# Patient Record
Sex: FEMALE | Race: WHITE | NOT HISPANIC OR LATINO | Employment: FULL TIME | URBAN - METROPOLITAN AREA
[De-identification: names, ages, dates, MRNs, and addresses within clinical notes are randomized per-mention and may not be internally consistent; named-entity substitution may affect disease eponyms.]

---

## 2021-09-29 ENCOUNTER — APPOINTMENT (OUTPATIENT)
Dept: RADIOLOGY | Facility: CLINIC | Age: 26
End: 2021-09-29
Payer: COMMERCIAL

## 2021-09-29 ENCOUNTER — OFFICE VISIT (OUTPATIENT)
Dept: URGENT CARE | Facility: CLINIC | Age: 26
End: 2021-09-29
Payer: COMMERCIAL

## 2021-09-29 VITALS
TEMPERATURE: 99.2 F | BODY MASS INDEX: 28.52 KG/M2 | RESPIRATION RATE: 16 BRPM | OXYGEN SATURATION: 99 % | WEIGHT: 155 LBS | DIASTOLIC BLOOD PRESSURE: 90 MMHG | SYSTOLIC BLOOD PRESSURE: 140 MMHG | HEIGHT: 62 IN | HEART RATE: 81 BPM

## 2021-09-29 DIAGNOSIS — M79.671 RIGHT FOOT PAIN: Primary | ICD-10-CM

## 2021-09-29 DIAGNOSIS — M79.671 RIGHT FOOT PAIN: ICD-10-CM

## 2021-09-29 PROCEDURE — 73630 X-RAY EXAM OF FOOT: CPT

## 2021-09-29 PROCEDURE — 99213 OFFICE O/P EST LOW 20 MIN: CPT | Performed by: PHYSICIAN ASSISTANT

## 2021-09-29 NOTE — LETTER
September 29, 2021     Patient: Marcelo Joseph   YOB: 1995   Date of Visit: 9/29/2021       To Whom It May Concern: It is my medical opinion that Marcelo Joseph may return to work on ***  If you have any questions or concerns, please don't hesitate to call           Sincerely,        May Toure PA-C    CC:   No Recipients

## 2021-09-29 NOTE — PATIENT INSTRUCTIONS
My interpretation of XR is no acute fracture or dislocation, sesamoid bone present on XR, official read is pending   Suspect pain most likely related to over use strain/sprain  Recommend continued use of rest, ice, compression and elevation  Continue ibuprofen/tylenol as needed for discomfort  Follow up with PCP  Return or be seen in ER with any worsening or progressing symptoms  Patient understands and is agreeable with this plan

## 2021-09-29 NOTE — LETTER
September 29, 2021     Patient: Thomas Alanis   YOB: 1995   Date of Visit: 9/29/2021       To Whom It May Concern: It is my medical opinion that Thomas Alanis may return to work on 9/30/21  If you have any questions or concerns, please don't hesitate to call           Sincerely,        Zachery Ramirez PA-C    CC: No Recipients

## 2021-09-29 NOTE — PROGRESS NOTES
[facility]        NAME: Radha Conley is a 31 y o  female  : 1995    MRN: [id number]  DATE: 2021  TIME: 10:19 AM    Assessment and Plan   Right foot pain [M79 671]  1  Right foot pain  XR foot 3+ vw right         Patient Instructions   Patient Instructions   My interpretation of XR is no acute fracture or dislocation, sesamoid bone present on XR, official read is pending   Suspect pain most likely related to over use strain/sprain  Recommend continued use of rest, ice, compression and elevation  Continue ibuprofen/tylenol as needed for discomfort  Follow up with PCP  Return or be seen in ER with any worsening or progressing symptoms  Patient understands and is agreeable with this plan  Follow up with PCP in 3-5 days  Proceed to  ER if symptoms worsen  Chief Complaint     Chief Complaint   Patient presents with    Foot Pain     Right foot pain started approx 1 week ago  Pt denies any acute injuries  History of Present Illness       Patient is a 24-year-old female presenting today for right foot pain x1 week  Patient notes following a long walk she felt some pain and discomfort the top of her right foot, states that she has been applying ice and taking ibuprofen twice a day with mild resolution of her symptoms, states the pain has been constant since onset, describes the pain as dull and achy, worse with ambulation and palpation  Denies numbness, tingling, bruising, swelling, weakness, inability to bear weight  Review of Systems   Review of Systems   Constitutional: Negative for chills and fever  HENT: Negative for ear pain and sore throat  Eyes: Negative for pain and visual disturbance  Respiratory: Negative for cough and shortness of breath  Cardiovascular: Negative for chest pain and palpitations  Gastrointestinal: Negative for abdominal pain and vomiting  Genitourinary: Negative for dysuria and hematuria     Musculoskeletal:        Right foot pain   Skin: Negative for color change and rash  Neurological: Negative for seizures and syncope  All other systems reviewed and are negative  Current Medications     No current outpatient medications on file  Current Allergies     Allergies as of 09/29/2021    (No Known Allergies)            The following portions of the patient's history were reviewed and updated as appropriate: allergies, current medications, past family history, past medical history, past social history, past surgical history and problem list      History reviewed  No pertinent past medical history  History reviewed  No pertinent surgical history  History reviewed  No pertinent family history  Medications have been verified  Objective   /90   Pulse 81   Temp 99 2 °F (37 3 °C)   Resp 16   Ht 5' 2" (1 575 m)   Wt 70 3 kg (155 lb)   SpO2 99%   BMI 28 35 kg/m²        Physical Exam     Physical Exam  Vitals reviewed  Constitutional:       Appearance: Normal appearance  HENT:      Head: Normocephalic and atraumatic  Right Ear: External ear normal       Left Ear: External ear normal       Nose: Nose normal    Eyes:      Conjunctiva/sclera: Conjunctivae normal    Cardiovascular:      Rate and Rhythm: Normal rate  Pulses: Normal pulses  Pulmonary:      Effort: Pulmonary effort is normal    Musculoskeletal:      Comments: No obvious deformity of right foot, no bruising, no swelling, mild TTP lateral dorsal aspect of right foot between 4th and 5th metatarsal   No medial or lateral malleolar tenderness, able to ambulate with mild discomfort, 5/5 strength of lower extremities bilaterally, gross sensation intact, full active and passive range of motion of lower extremities bilaterally, 2+ distal pulses  Skin:     General: Skin is warm  Capillary Refill: Capillary refill takes less than 2 seconds  Neurological:      General: No focal deficit present        Mental Status: She is alert and oriented to person, place, and time

## 2022-01-06 NOTE — PROGRESS NOTES
FAMILY PRACTICE HEALTH MAINTENANCE OFFICE VISIT  [facility]    NAME: Miky Acharya  AGE: 31 y o  SEX: female  : 1995     DATE: 2022    Assessment and Plan     There are no diagnoses linked to this encounter  · Patient Counseling:   · Nutrition: Stressed importance of a well balanced diet, moderation of sodium/saturated fat, caloric balance and sufficient intake of fiber  · Exercise: Stressed the importance of regular exercise with a goal of 150 minutes per week  · Dental Health: Discussed daily flossing and brushing and regular dental visits     · Immunizations reviewed: see orders  · Discussed benefits of:  Cervical Cancer screening and Screening labs   BMI Counseling: There is no height or weight on file to calculate BMI  Discussed with patient's BMI with her  The BMI is above normal  Nutrition recommendations include reducing portion sizes, decreasing overall calorie intake and 3-5 servings of fruits/vegetables daily  Exercise recommendations include moderate aerobic physical activity for 150 minutes/week  No follow-ups on file  Chief Complaint   No chief complaint on file  History of Present Illness     NP  From here and recently moved back from [place] to help her mom who has caner  She is doing well  She denies complaints or issues today  Feels well         Well Adult Physical   Patient here for a comprehensive physical exam       Diet and Physical Activity  Diet: well balanced diet  Exercise: daily      Depression Screen  PHQ-2/9 Depression Screening            General Health  Hearing: Normal:  bilateral  Vision: no vision problems  Dental: regular dental visits    Reproductive Health  No issues  and Follows with gynecologist      The following portions of the patient's history were reviewed and updated as appropriate: allergies, current medications, past family history, past medical history, past social history, past surgical history and problem list     Review of Systems     Review of Systems   Constitutional: Negative  HENT: Negative  Eyes: Negative  Respiratory: Negative  Cardiovascular: Negative  Gastrointestinal: Negative  Endocrine: Negative  Genitourinary: Negative  Musculoskeletal: Negative  Skin: Negative  Allergic/Immunologic: Negative  Neurological: Negative  Hematological: Negative  Psychiatric/Behavioral: Negative  Past Medical History     No past medical history on file  Past Surgical History     No past surgical history on file  Social History     Social History     Socioeconomic History    Marital status: Not on file     Spouse name: Not on file    Number of children: Not on file    Years of education: Not on file    Highest education level: Not on file   Occupational History    Not on file   Tobacco Use    Smoking status: Not on file    Smokeless tobacco: Not on file   Substance and Sexual Activity    Alcohol use: Not on file    Drug use: Not on file    Sexual activity: Not on file   Other Topics Concern    Not on file   Social History Narrative    Not on file     Social Determinants of Health     Financial Resource Strain: Not on file   Food Insecurity: Not on file   Transportation Needs: Not on file   Physical Activity: Not on file   Stress: Not on file   Social Connections: Not on file   Intimate Partner Violence: Not on file   Housing Stability: Not on file       Family History     No family history on file  Current Medications     No current outpatient medications on file  Allergies     Not on File    Objective     There were no vitals taken for this visit  Physical Exam  Constitutional:       General: She is not in acute distress  Appearance: She is well-developed  She is not diaphoretic  HENT:      Head: Normocephalic and atraumatic  Right Ear: External ear normal       Left Ear: External ear normal    Neck:      Thyroid: No thyromegaly  Cardiovascular:      Rate and Rhythm: Normal rate and regular rhythm  Heart sounds: Normal heart sounds  No murmur heard  No friction rub  No gallop  Pulmonary:      Effort: Pulmonary effort is normal       Breath sounds: Normal breath sounds  No wheezing or rales  Abdominal:      General: Bowel sounds are normal       Palpations: Abdomen is soft  There is no mass  Tenderness: There is no abdominal tenderness  There is no rebound  Musculoskeletal:         General: No deformity  Normal range of motion  Cervical back: Normal range of motion and neck supple  Lymphadenopathy:      Cervical: No cervical adenopathy  Skin:     General: Skin is warm and dry  Findings: No rash  Neurological:      Mental Status: She is alert and oriented to person, place, and time  Cranial Nerves: No cranial nerve deficit  Motor: No abnormal muscle tone  Coordination: Coordination normal       Deep Tendon Reflexes: Reflexes are normal and symmetric  Reflexes normal    Psychiatric:         Behavior: Behavior normal          Thought Content:  Thought content normal          Judgment: Judgment normal            No exam data present        Townsend Schlatter, [address]

## 2022-01-10 ENCOUNTER — OFFICE VISIT (OUTPATIENT)
Dept: FAMILY MEDICINE CLINIC | Facility: CLINIC | Age: 27
End: 2022-01-10
Payer: COMMERCIAL

## 2022-01-10 VITALS
BODY MASS INDEX: 29.77 KG/M2 | HEIGHT: 63 IN | RESPIRATION RATE: 16 BRPM | SYSTOLIC BLOOD PRESSURE: 102 MMHG | DIASTOLIC BLOOD PRESSURE: 62 MMHG | HEART RATE: 80 BPM | WEIGHT: 168 LBS | TEMPERATURE: 98.1 F | OXYGEN SATURATION: 99 %

## 2022-01-10 DIAGNOSIS — Z00.00 WELL ADULT EXAM: Primary | ICD-10-CM

## 2022-01-10 DIAGNOSIS — Z13.6 SCREENING FOR CARDIOVASCULAR CONDITION: ICD-10-CM

## 2022-01-10 DIAGNOSIS — R53.83 OTHER FATIGUE: ICD-10-CM

## 2022-01-10 DIAGNOSIS — Z23 NEED FOR VACCINATION: ICD-10-CM

## 2022-01-10 DIAGNOSIS — F43.9 STRESS: ICD-10-CM

## 2022-01-10 PROBLEM — L70.8 OTHER ACNE: Status: ACTIVE | Noted: 2022-01-10

## 2022-01-10 PROCEDURE — 3008F BODY MASS INDEX DOCD: CPT | Performed by: INTERNAL MEDICINE

## 2022-01-10 PROCEDURE — 1036F TOBACCO NON-USER: CPT | Performed by: INTERNAL MEDICINE

## 2022-01-10 PROCEDURE — 99385 PREV VISIT NEW AGE 18-39: CPT | Performed by: INTERNAL MEDICINE

## 2022-01-10 PROCEDURE — 3725F SCREEN DEPRESSION PERFORMED: CPT | Performed by: INTERNAL MEDICINE

## 2022-01-10 PROCEDURE — 90471 IMMUNIZATION ADMIN: CPT

## 2022-01-10 PROCEDURE — 90715 TDAP VACCINE 7 YRS/> IM: CPT

## 2022-01-10 RX ORDER — SPIRONOLACTONE 100 MG/1
100 TABLET, FILM COATED ORAL DAILY
COMMUNITY
End: 2022-06-21

## 2022-01-12 ENCOUNTER — APPOINTMENT (OUTPATIENT)
Dept: LAB | Facility: CLINIC | Age: 27
End: 2022-01-12
Payer: COMMERCIAL

## 2022-01-12 DIAGNOSIS — R53.83 OTHER FATIGUE: ICD-10-CM

## 2022-01-12 DIAGNOSIS — Z13.6 SCREENING FOR CARDIOVASCULAR CONDITION: ICD-10-CM

## 2022-01-12 LAB
ALBUMIN SERPL BCP-MCNC: 4.1 G/DL (ref 3.5–5)
ALP SERPL-CCNC: 40 U/L (ref 46–116)
ALT SERPL W P-5'-P-CCNC: 16 U/L (ref 12–78)
ANION GAP SERPL CALCULATED.3IONS-SCNC: 3 MMOL/L (ref 4–13)
AST SERPL W P-5'-P-CCNC: 9 U/L (ref 5–45)
BASOPHILS # BLD AUTO: 0.01 THOUSANDS/ΜL (ref 0–0.1)
BASOPHILS NFR BLD AUTO: 0 % (ref 0–1)
BILIRUB SERPL-MCNC: 0.63 MG/DL (ref 0.2–1)
BUN SERPL-MCNC: 9 MG/DL (ref 5–25)
CALCIUM SERPL-MCNC: 9.2 MG/DL (ref 8.3–10.1)
CHLORIDE SERPL-SCNC: 108 MMOL/L (ref 100–108)
CHOLEST SERPL-MCNC: 196 MG/DL
CO2 SERPL-SCNC: 27 MMOL/L (ref 21–32)
CREAT SERPL-MCNC: 0.79 MG/DL (ref 0.6–1.3)
EOSINOPHIL # BLD AUTO: 0.14 THOUSAND/ΜL (ref 0–0.61)
EOSINOPHIL NFR BLD AUTO: 2 % (ref 0–6)
ERYTHROCYTE [DISTWIDTH] IN BLOOD BY AUTOMATED COUNT: 11.8 % (ref 11.6–15.1)
GFR SERPL CREATININE-BSD FRML MDRD: 102 ML/MIN/1.73SQ M
GLUCOSE P FAST SERPL-MCNC: 82 MG/DL (ref 65–99)
HCT VFR BLD AUTO: 39.9 % (ref 34.8–46.1)
HDLC SERPL-MCNC: 54 MG/DL
HGB BLD-MCNC: 13.8 G/DL (ref 11.5–15.4)
IMM GRANULOCYTES # BLD AUTO: 0.03 THOUSAND/UL (ref 0–0.2)
IMM GRANULOCYTES NFR BLD AUTO: 0 % (ref 0–2)
LDLC SERPL CALC-MCNC: 124 MG/DL (ref 0–100)
LYMPHOCYTES # BLD AUTO: 1.43 THOUSANDS/ΜL (ref 0.6–4.47)
LYMPHOCYTES NFR BLD AUTO: 19 % (ref 14–44)
MCH RBC QN AUTO: 32 PG (ref 26.8–34.3)
MCHC RBC AUTO-ENTMCNC: 34.6 G/DL (ref 31.4–37.4)
MCV RBC AUTO: 93 FL (ref 82–98)
MONOCYTES # BLD AUTO: 0.51 THOUSAND/ΜL (ref 0.17–1.22)
MONOCYTES NFR BLD AUTO: 7 % (ref 4–12)
NEUTROPHILS # BLD AUTO: 5.37 THOUSANDS/ΜL (ref 1.85–7.62)
NEUTS SEG NFR BLD AUTO: 72 % (ref 43–75)
NONHDLC SERPL-MCNC: 142 MG/DL
NRBC BLD AUTO-RTO: 0 /100 WBCS
PLATELET # BLD AUTO: 240 THOUSANDS/UL (ref 149–390)
PMV BLD AUTO: 10.3 FL (ref 8.9–12.7)
POTASSIUM SERPL-SCNC: 4.2 MMOL/L (ref 3.5–5.3)
PROT SERPL-MCNC: 7.3 G/DL (ref 6.4–8.2)
RBC # BLD AUTO: 4.31 MILLION/UL (ref 3.81–5.12)
SODIUM SERPL-SCNC: 138 MMOL/L (ref 136–145)
TRIGL SERPL-MCNC: 92 MG/DL
TSH SERPL DL<=0.05 MIU/L-ACNC: 0.83 UIU/ML (ref 0.36–3.74)
WBC # BLD AUTO: 7.49 THOUSAND/UL (ref 4.31–10.16)

## 2022-01-12 PROCEDURE — 36415 COLL VENOUS BLD VENIPUNCTURE: CPT

## 2022-01-12 PROCEDURE — 80053 COMPREHEN METABOLIC PANEL: CPT

## 2022-01-12 PROCEDURE — 85025 COMPLETE CBC W/AUTO DIFF WBC: CPT

## 2022-01-12 PROCEDURE — 80061 LIPID PANEL: CPT

## 2022-01-12 PROCEDURE — 84443 ASSAY THYROID STIM HORMONE: CPT

## 2022-01-20 ENCOUNTER — TELEPHONE (OUTPATIENT)
Dept: BEHAVIORAL/MENTAL HEALTH CLINIC | Facility: CLINIC | Age: 27
End: 2022-01-20

## 2022-01-20 NOTE — TELEPHONE ENCOUNTER
Behavorial Health Outpatient Intake Questions    Referred by: PC    Please advised interviewee that they need to answer all questions truthfully to allow for best care and any misrepresentations of information may affect their ability to be seen at this clinic   => Was this discussed? Yes     [facility] -     Presenting Problem (in patient's words): Life stresses  Are there any developmental disabilities? ? If yes, can they speak to you on the phone? If they are too limited to speak to you on phone, refer out No    Are you taking any psychiatric medications? No    => If yes, who prescribes? If yes, are they injectable medications? Does the patient have a language barrier or hearing impairment? No    Have you been treated at [facility] by a therapist or a doctor in the past? If yes, who? No    Has the patient been hospitalized for mental health? No   If yes, how long ago was last hospitalization and where was it? Do you actively use alcohol or marijuana or illegal substances? If yes, what and how much - refer out to Drug and alcohol treatment if use is excessive or daily use of illegal substances none    Do you have a community treatment team or ? No    Legal History-     Does the patient have any history of arrests, custodial/nursing home time, or DUIs? No  If Yes-  1) What types of charges? 2) When were they last incarcerated? 3) Are they currently on parole or probation? Minor Child-    Who has custody of the child? Is there a custody agreement? If there is a custody agreement remind parent that they must bring a copy to the first appt or they will not be seen       Intake Team, please check with provider before scheduling if flags come up such as:  - complex case  - legal history (other than DUI)  - communication barrier concerns are present  - if, in your judgment, this needs further review    ACCEPTED as a patient Yes  => Appointment Date: 1/25/2022    Referred Elsewhere? Name of Insurance Co:  Insurance ID#  [employer] #  If ins is primary or secondary  If patient is a minor, parents information such as Name, D  O B of guarantor

## 2022-01-25 ENCOUNTER — SOCIAL WORK (OUTPATIENT)
Dept: BEHAVIORAL/MENTAL HEALTH CLINIC | Facility: CLINIC | Age: 27
End: 2022-01-25
Payer: COMMERCIAL

## 2022-01-25 DIAGNOSIS — F43.22 ADJUSTMENT DISORDER WITH ANXIOUS MOOD: Primary | ICD-10-CM

## 2022-01-25 DIAGNOSIS — F43.9 STRESS: ICD-10-CM

## 2022-01-25 PROCEDURE — 90791 PSYCH DIAGNOSTIC EVALUATION: CPT | Performed by: SOCIAL WORKER

## 2022-01-25 NOTE — PSYCH
This note was not shared with the patient due to this is a psychotherapy note    Assessment/Plan:      Diagnoses and all orders for this visit:    Adjustment disorder with anxious mood    Stress  -     Ambulatory referral to Psychiatry          Subjective: Ira Maciel is seeking mental health support to help cope and repair sentiments of burn out  She describes herself as apathetic, a "worrier," dissatisfied with her job, and avoidant of intimate partnerships  She is recently moved back home to [address] having lived in [facility] [place]  for the past 10 years  Denies SI/HI/substance abuse/overt psychotic features/self harm/eating disorder  Patient ID: Aide Goldberg is a 31 y o  female  HPI:     Pre-morbid level of function and History of Present Illness: Ira Maciel reports feeling a built up of pressure and dissatisfaction within the past year, due superficially to the Covid 19 pandemic  Previous Psychiatric/psychological treatment/year: Some outpatient counseling when she was 16-14 years old but wasn't "ready for it"  Current Psychiatrist/Therapist: None  Outpatient and/or Partial and Other [employer] Used (CTT, ICM, VNA): None  Problem Assessment:     SOCIAL/VOCATION:  Family Constellation (include parents, relationship with each and pertinent Psych/Medical History):     Family History   Problem Relation Age of Onset    Throat cancer Mother     Heart attack Father     Colon cancer Maternal Grandfather     Colon cancer Maternal Jaleel Mchugh is the only child to her biological parents  Her father is  having  of a heart attack when she was 12years old  She has a strong relationship with her mother, who was diagnosed with throat cancer in   Ira Maciel took a temporary leave from her position at work and moved home to take care of her while she underwent chemotherapy and radiation  Her mom is now in remission and doing well   There is a strong history of alcohol use disorder on both sides of her family, and her father struggled with this greatly  She and her mom attended [facility] for support  Cherise Caba relates best to her mom  she lives with her mom  she does not live alone  Domestic Violence: No past history of domestic violence    Additional Comments related to family/relationships/peer support: Cherise Caba has always felt slightly out of place within her peer group due to being an only child  School or Work History (strengths/limitations/needs): Cherise Caba reports having a strong identity linked to her academic performance  She 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 and went to [employer] to pursue a degree in [place] [employer] and [employer]  While there, she reports feel humbled having met "the other big fish in a small pond"  After graduation she remained in [place], [place]  She currently works for the [employer] and is a   She currently feels that she has hit her "ceiling" and is thus, looking to explore other opportunities  Her highest grade level achieved was a Bachelor's degree   history includes none  Financial status includes comfortable; able to meet basic needs  LEISURE ASSESSMENT (Include past and present hobbies/interests and level of involvement (Ex: Group/Club Affiliations): Skiing, art & culture, going to museums, cooking and having dinner parties, watching television and movies, walking, and going to concerts  her primary language is [place]  Preferred language is [place]  Ethnic considerations are "[address] guilt"  Religions affiliations and level of involvement - none  Does spirituality help you cope? No    FUNCTIONAL STATUS: There has been a recent change in Cherise Caba ability to do the following: None  Level of Assistance Needed/By Whom?: N/A      Cherise Caba learns best by  reading    SUBSTANCE ABUSE ASSESSMENT: no substance abuse    Substance/Route/Age/Amount/Frequency/Last Use: None  DETOX HISTORY: None  Previous detox/rehab treatment: N/A    HEALTH ASSESSMENT: no referral to PCP needed    LEGAL: No Mental Health Advance Directive or Power of  on file    Prenatal History: uneventful pregnancy    Delivery History: born by vaginal delivery    Developmental Milestones: Within normal ranges  Temperament as an infant was normal     Temperament as a toddler was normal   Temperament at school age was normal   Temperament as a teenager was normal     Risk Assessment:   The following ratings are based on my interview(s) with the patient  Risk of Harm to Self:   Demographic risk factors include   Historical Risk Factors include None  Recent Specific Risk Factors include None  Additional Factors for a Child or Adolescent N/A    Risk of Harm to Others:   Demographic Risk Factors include None  Historical Risk Factors include None  Recent Specific Risk Factors include None  Access to Weapons:   Urban Dannielle has access to the following weapons: none   The following steps have been taken to ensure weapons are properly secured: N/A    Based on the above information, the client presents the following risk of harm to self or others:  low    The following interventions are recommended:   no intervention changes    Notes regarding this Risk Assessment: None    Review Of Systems:     Mood Normal   Behavior Normal    Thought Content Normal   General Normal    Personality Normal   Other Psych Symptoms Normal   Constitutional Normal   ENT Normal   Cardiovascular Normal    Respiratory Normal    Gastrointestinal Normal   Genitourinary Normal    Musculoskeletal Negative   Integumentary Normal    Neurological Normal    Endocrine Normal          Mental status:  Appearance calm and cooperative , adequate hygiene and grooming and good eye contact    Mood euthymic   Affect affect appropriate    Speech a normal rate   Thought Processes normal thought processes   Hallucinations no hallucinations present    Thought Content no delusions   Abnormal Thoughts no suicidal thoughts  and no homicidal thoughts    Orientation  oriented to person and place and time   Remote Memory short term memory intact and long term memory intact   Attention Span concentration intact   Intellect Appears to be of Average Intelligence   Fund of Knowledge displays adequate knowledge of current events, adequate fund of knowledge regarding past history and adequate fund of knowledge regarding vocabulary    Insight Insight intact   Judgement judgment was intact   Muscle Strength Muscle strength and tone were normal   Language no difficulty naming common objects, no difficulty repeating a phrase  and no difficulty writing a sentence    Pain none   Pain Scale 0

## 2022-01-25 NOTE — BH TREATMENT PLAN
Fauzia Spicer  1995       Date of Initial Treatment Plan: 01/25/2022  Date of Current Treatment Plan: 01/25/22    Treatment Plan Number 1    Strengths/Personal Resources for Self Care: "Good friend," have insight, relative stable mood    Diagnosis:   1  Adjustment disorder with anxious mood     2  Stress  Ambulatory referral to Psychiatry       Area of Needs: Life transitions      Long Term Goal 1: Develop positive coping strategies to help transition into different phases of life (job, relationships)    Target Date: 7/25/2022  Completion Date: N/A         Short Term Objectives for Goal 1: Identify top 3 values which guide decisions, discuss and acknowledge 2 triggers and cues for rising stress, learn about and practice at least 2 grounding/relaxation exercises weekly, practice setting intentional and challenging limits which confront people pleasing/avoidance of conflicts and vulnerability with others, identify at least 3 "parts" of the self and process their origin, function, and unmet emotional needs  GOAL 1: Modality: Individual 4x per month   Completion Date 7/25/2022 and The person(s) responsible for carrying out the plan is  the patient  Behavioral Health Treatment Plan [facility]: Diagnosis and Treatment Plan explained to Delta Weinstein relates understanding diagnosis and is agreeable to Treatment Plan  Client Comments : Please share your thoughts, feelings, need and/or experiences regarding your treatment plan: None

## 2022-02-01 ENCOUNTER — SOCIAL WORK (OUTPATIENT)
Dept: BEHAVIORAL/MENTAL HEALTH CLINIC | Facility: CLINIC | Age: 27
End: 2022-02-01
Payer: COMMERCIAL

## 2022-02-01 DIAGNOSIS — F43.22 ADJUSTMENT DISORDER WITH ANXIOUS MOOD: Primary | ICD-10-CM

## 2022-02-01 PROCEDURE — 90834 PSYTX W PT 45 MINUTES: CPT | Performed by: SOCIAL WORKER

## 2022-02-01 NOTE — PSYCH
This note was not shared with the patient due to this is a psychotherapy note    Psychotherapy Provided: Individual Psychotherapy 60 minutes     Length of time in session: 60 minutes, follow up in 1 week    Encounter Diagnosis     ICD-10-CM    1  Adjustment disorder with anxious mood  F43 [id number]        Goals addressed in session: Goal 1     Pain:      none    0    Current suicide risk : Low     DATA: Met with Nora for scheduled individual session  Reported feeling well overall and denied significant shifts in mood  Discussed family history of needing to take care of her mother multiple times for near death experiences (sepsis in college and then having cancer in 2020) and how this, plus her father's alcoholism, has impacted her ability to meet her emotional needs directly  Writer pointed out a number of times during the appointment where she disregarded her needs before even saying them, which once challenged, she was able to point out  Worked on building self compassion through identifying things that bring her mark, and allowing herself to live only for herself  ASSESSMENT: Loida Brito presents with a normal mood  Her affect is normal range and intensity, appropriate  Loida Brito exhibits good therapeutic rapport with this clinician  Loida Brito continues to exhibit willingness to work on treatment goals and objectives  Loida Brito presents with a minimal risk of suicide, minimal risk of self-harm, and minimal risk of harm to others  Reviewed and signed treatment plan  PLAN: Loida Brito will return in 1 week for the next scheduled session  Between sessions, Loida Brito will be intentional in speaking her emotional needs, and will report back during the next session re: successes and barriers   At the next session, this clinician will use supportive psychotherapy, mindfulness-based strategies, CBT techniques, Motivational Interviewing and ACT to address stress management and life transitions, in an effort to assist Loida Brito with meeting treatment goals  Behavioral Health Treatment Plan [address] [place] [address] [address]: Diagnosis and Treatment Plan explained to Sarah Mak relates understanding diagnosis and is agreeable to Treatment Plan   Yes

## 2022-02-15 ENCOUNTER — SOCIAL WORK (OUTPATIENT)
Dept: BEHAVIORAL/MENTAL HEALTH CLINIC | Facility: CLINIC | Age: 27
End: 2022-02-15
Payer: COMMERCIAL

## 2022-02-15 DIAGNOSIS — F43.22 ADJUSTMENT DISORDER WITH ANXIOUS MOOD: Primary | ICD-10-CM

## 2022-02-15 PROCEDURE — 90834 PSYTX W PT 45 MINUTES: CPT | Performed by: SOCIAL WORKER

## 2022-02-15 NOTE — PSYCH
This note was not shared with the patient due to this is a psychotherapy note    Psychotherapy Provided: Individual Psychotherapy 60 minutes     Length of time in session: 60 minutes, follow up in 1 week    Encounter Diagnosis     ICD-10-CM    1  Adjustment disorder with anxious mood  F43 22        Goals addressed in session: Goal 1     Pain:      none    0    Current suicide risk : Low     DATA: Met with Nora for scheduled individual session  She reported that it is her birthday but denied having anything special planned  We discussed how this is commonplace when she is near home but how she allows herself to celebrate it and "be showy" when she is living with friends  We identified family dynamics which perpetuate this, as well as, anniversary triggers (her father's death and mom's medical procedures) which complicate matters as well  We visualized what her life would look like more self focused and encouraged her to lean into her gut, heart, and head voice  ASSESSMENT: Ira Maciel presents with a improved mood  Her affect is normal range and intensity, appropriate  Ira Maciel exhibits good therapeutic rapport with this clinician  Ira Maciel continues to exhibit willingness to work on treatment goals and objectives  Ira Maciel presents with a minimal risk of suicide, minimal risk of self-harm, and minimal risk of harm to others  PLAN: Ira Maciel will return in 1 week for the next scheduled session  Between sessions, Ira Maciel will prioritize her own needs by leaning into her gut voice as it arises, and acting mindfully in accordance with what it is trying to protect her from, and will report back during the next session re: successes and barriers  At the next session, this clinician will use supportive psychotherapy, mindfulness-based strategies and CBT techniques to address meeting emotional needs, in an effort to [place] with meeting treatment goals       [address]: Diagnosis and Treatment Plan explained to Britney Schultz relates understanding diagnosis and is agreeable to Treatment Plan   Yes

## 2022-03-01 ENCOUNTER — SOCIAL WORK (OUTPATIENT)
Dept: BEHAVIORAL/MENTAL HEALTH CLINIC | Facility: CLINIC | Age: 27
End: 2022-03-01
Payer: COMMERCIAL

## 2022-03-01 DIAGNOSIS — F43.22 ADJUSTMENT DISORDER WITH ANXIOUS MOOD: Primary | ICD-10-CM

## 2022-03-01 PROCEDURE — 90834 PSYTX W PT 45 MINUTES: CPT | Performed by: SOCIAL WORKER

## 2022-03-01 NOTE — PSYCH
This note was not shared with the patient due to this is a psychotherapy note    Psychotherapy Provided: Individual Psychotherapy 60 minutes     Length of time in session: 60 minutes, follow up in 2 week    Encounter Diagnosis     ICD-10-CM    1  Adjustment disorder with anxious mood  F43 22        Goals addressed in session: Goal 1     Pain:      none    0    Current suicide risk : Low     DATA: Met with Nora for scheduled individual session  She reported going on a "girls trip" with friends from college and discussed ways in which she was able to identify needs, people pleasing tendencies, as well as, reluctance to "rock the boat"  Writer helped draw connections from past experiences and feeling states, coached her through a rehearsal of assertiveness training, and created the visualization of feeling and being brave  ASSESSMENT: Santiago Leach presents with a improved mood  Her affect is normal range and intensity, appropriate  Santiago Leach exhibits good therapeutic rapport with this clinician  Santiago Leach continues to exhibit willingness to work on treatment goals and objectives  Santiago Leach presents with a minimal risk of suicide, minimal risk of self-harm, and minimal risk of harm to others  PLAN: Santiago Leach will return in 2 weeks for the next scheduled session  Between sessions, Santiago Leach will work on meeting her needs in the moment or through a "second thought" and will report back during the next session re: successes and barriers  At the next session, this clinician will use supportive psychotherapy, mindfulness-based strategies, CBT techniques and Motivational Interviewing to address people pleasing tendencies, in an effort to assist Santiago Leach with meeting treatment goals  Behavioral Health Treatment Plan [facility]: Diagnosis and Treatment Plan explained to Celso Hairston relates understanding diagnosis and is agreeable to Treatment Plan   Yes

## 2022-03-15 ENCOUNTER — SOCIAL WORK (OUTPATIENT)
Dept: BEHAVIORAL/MENTAL HEALTH CLINIC | Facility: CLINIC | Age: 27
End: 2022-03-15
Payer: COMMERCIAL

## 2022-03-15 DIAGNOSIS — F43.22 ADJUSTMENT DISORDER WITH ANXIOUS MOOD: Primary | ICD-10-CM

## 2022-03-15 PROCEDURE — 90834 PSYTX W PT 45 MINUTES: CPT | Performed by: SOCIAL WORKER

## 2022-03-15 NOTE — PSYCH
This note was not shared with the patient due to this is a psychotherapy note    Psychotherapy Provided: Individual Psychotherapy 60 minutes     Length of time in session: 60 minutes, follow up in 2 week    Encounter Diagnosis     ICD-10-CM    1  Adjustment disorder with anxious mood  F43 22        Goals addressed in session: Goal 1     Pain:      none    0    Current suicide risk : Low     DATA: Met with Nora for scheduled individual session  She reported feeling well overall and shared connecting with sentiments of anger, which we processed fully and examined  We acknowledged her people pleasing role as that of a byproduct from her early childhood experiences and how it also mimics the function of an addiction  We role played boundary setting skills and brainstormed "rule of 2, 3" to help set limits in the moment  ASSESSMENT: Nic Ovalle presents with a improved mood  Her affect is normal range and intensity, appropriate  Nic Ovalle exhibits good therapeutic rapport with this clinician  Nic Ovalle continues to exhibit willingness to work on treatment goals and objectives  Nic Ovalle presents with a minimal risk of suicide, minimal risk of self-harm, and minimal risk of harm to others  PLAN: Nic Ovalle will return in 2 weeks for the next scheduled session  Between sessions, Nic Ovalle will set boundaries with her mother and will report back during the next session re: successes and barriers  At the next session, this clinician will use supportive psychotherapy, mindfulness-based strategies and CBT techniques to address people pleasing tendencies, in an effort to assist Nic Ovalle with meeting treatment goals  Behavioral Health Treatment Plan [facility]: Diagnosis and Treatment Plan explained to Kerwin Medicus relates understanding diagnosis and is agreeable to Treatment Plan   Yes

## 2022-03-29 ENCOUNTER — SOCIAL WORK (OUTPATIENT)
Dept: BEHAVIORAL/MENTAL HEALTH CLINIC | Facility: CLINIC | Age: 27
End: 2022-03-29
Payer: COMMERCIAL

## 2022-03-29 DIAGNOSIS — F43.22 ADJUSTMENT DISORDER WITH ANXIOUS MOOD: Primary | ICD-10-CM

## 2022-03-29 PROCEDURE — 90834 PSYTX W PT 45 MINUTES: CPT | Performed by: SOCIAL WORKER

## 2022-03-29 NOTE — PSYCH
This note was not shared with the patient due to this is a psychotherapy note    Psychotherapy Provided: Individual Psychotherapy 60 minutes     Length of time in session: 60 minutes, follow up in 2 week    Encounter Diagnosis     ICD-10-CM    1  Adjustment disorder with anxious mood  F43 22        Goals addressed in session: Goal 1     Pain:      none    0    Current suicide risk : Low     DATA: Met with Nora for scheduled individual session  She reported feeling well overall and denied significant shifts in mood  She reported speaking to her mother about not feeling as supported as she'd like to, as well as, trying to set limits in overcommitting by telling people, "I have to think about it" before giving an answer  She admits however to procrastinating, which we decided to work on by setting a hard limit of: "I have to think about it and will get back to you by the end of the day"  We processed avoidance behaviors, as well as, underlying fear of not having supports should she begin to set limits  ASSESSMENT: Ulysses Ziegler presents with a normal mood  Her affect is normal range and intensity, appropriate  Ulysses Ziegler exhibits good therapeutic rapport with this clinician  Ulysses Ziegler continues to exhibit willingness to work on treatment goals and objectives  Ulysses Ziegler presents with a minimal risk of suicide, minimal risk of self-harm, and minimal risk of harm to others  PLAN: Ulysses Ziegler will return in 2 weeks for the next scheduled session  Between sessions, Ulysses Ziegler will make it a point to have "3 difficult conversations per week" and will report back during the next session re: successes and barriers  At the next session, this clinician will use supportive psychotherapy, mindfulness-based strategies and CBT techniques to address people pleasing tendencies, in an effort to assist Ulysses Ziegler with meeting treatment goals       Behavioral Health Treatment Plan [facility]: Diagnosis and Treatment Plan explained to Ulysses Ziegler, Cherise Caba relates understanding diagnosis and is agreeable to Treatment Plan   Yes

## 2022-05-03 ENCOUNTER — SOCIAL WORK (OUTPATIENT)
Dept: BEHAVIORAL/MENTAL HEALTH CLINIC | Facility: CLINIC | Age: 27
End: 2022-05-03
Payer: COMMERCIAL

## 2022-05-03 DIAGNOSIS — F43.22 ADJUSTMENT DISORDER WITH ANXIOUS MOOD: Primary | ICD-10-CM

## 2022-05-03 PROCEDURE — 90834 PSYTX W PT 45 MINUTES: CPT | Performed by: SOCIAL WORKER

## 2022-05-03 NOTE — PSYCH
This note was not shared with the patient due to this is a psychotherapy note    Psychotherapy Provided: Individual Psychotherapy 45 minutes     Length of time in session: 45 minutes, follow up in 1-2 week    Encounter Diagnosis     ICD-10-CM    1  Adjustment disorder with anxious mood  F43 22        Goals addressed in session: Goal 1     Pain:      none    0    Current suicide risk : Low     DATA: Met with Nora for scheduled individual session  She reported falling back into old habits of suppressing her emotions after having accessed sentiments of anger and sadness  We discussed this being a reaction of not having been seen for a counseling appointment in awhile due to various reasons, and writer encouraged her to visualize progress as an obtainable entity once practiced consistently  We acknowledged a "resistant boulder" as an actual barrier in moving forward and broke down manageable pieces, such as: having a conversation with a mentor or someone who she trusts with regard to not knowing what she should do next career wise  ASSESSMENT: Meagan Koroma presents with a euthymic mood  Her affect is normal range and intensity, appropriate  Meagan Koroma exhibits good therapeutic rapport with this clinician  Meagan Koroma continues to exhibit willingness to work on treatment goals and objectives  PLAN: Meagan Koroma will return in 1-2 weeks for the next scheduled session  Between sessions, Meagan Koroma will have a difficult conversation with someone and allow herself to be vulnverable and will report back during the next session re: successes and barriers  At the next session, this clinician will use supportive psychotherapy, mindfulness-based strategies and CBT techniques to address being emotional availability, in an effort to assist Meagan Koroma with meeting treatment goals       Behavioral Health Treatment Plan [facility]: Diagnosis and Treatment Plan explained to Alma Delacruz relates understanding diagnosis and is agreeable to Treatment Plan   Yes

## 2022-05-12 ENCOUNTER — OFFICE VISIT (OUTPATIENT)
Dept: OBGYN CLINIC | Facility: CLINIC | Age: 27
End: 2022-05-12
Payer: COMMERCIAL

## 2022-05-12 VITALS — SYSTOLIC BLOOD PRESSURE: 122 MMHG | BODY MASS INDEX: 30.96 KG/M2 | DIASTOLIC BLOOD PRESSURE: 72 MMHG | WEIGHT: 172 LBS

## 2022-05-12 DIAGNOSIS — Z00.00 WELL ADULT EXAM: ICD-10-CM

## 2022-05-12 DIAGNOSIS — R39.12 WEAK URINARY STREAM: ICD-10-CM

## 2022-05-12 DIAGNOSIS — Z01.419 ENCOUNTER FOR ANNUAL ROUTINE GYNECOLOGICAL EXAMINATION: Primary | ICD-10-CM

## 2022-05-12 DIAGNOSIS — N89.8 VAGINAL DISCHARGE: ICD-10-CM

## 2022-05-12 PROCEDURE — 99385 PREV VISIT NEW AGE 18-39: CPT | Performed by: PHYSICIAN ASSISTANT

## 2022-05-12 PROCEDURE — 1036F TOBACCO NON-USER: CPT | Performed by: PHYSICIAN ASSISTANT

## 2022-05-12 NOTE — ASSESSMENT & PLAN NOTE
-Recommended patient continue to monitor  -Instructed patient to call the office if her symptoms worsen or if she would like a referral to urology

## 2022-05-12 NOTE — ASSESSMENT & PLAN NOTE
-Recommended patient utilize coconut oil for it's anti-bacterial and anti-fungal properties  -Instructed patient to call the office if her symptoms worsen

## 2022-05-12 NOTE — ASSESSMENT & PLAN NOTE
-Reviewed pap smear screening guidelines  -Educated patient on the pros and cons of various forms of birth control including OCPs, IUDs, and Nexplanon  -Patient would like to research further on her own before making a decision  -Educated patient on Gardasil vaccine series recommendations  Patient will call her insurance to determine coverage and call the office if she would like to begin the series     -Recommend follow up in one year or sooner if issues arise

## 2022-05-12 NOTE — PROGRESS NOTES
Assessment/Plan   Problem List Items Addressed This Visit        Other    Encounter for annual routine gynecological examination - Primary     -Reviewed pap smear screening guidelines  -Educated patient on the pros and cons of various forms of birth control including OCPs, IUDs, and Nexplanon  -Patient would like to research further on her own before making a decision  -Educated patient on Gardasil vaccine series recommendations  Patient will call her insurance to determine coverage and call the office if she would like to begin the series  -Recommend follow up in one year or sooner if issues arise           Weak urinary stream     -Recommended patient continue to monitor  -Instructed patient to call the office if her symptoms worsen or if she would like a referral to urology           Vaginal discharge     -Recommended patient utilize coconut oil for it's anti-bacterial and anti-fungal properties  -Instructed patient to call the office if her symptoms worsen              Other Visit Diagnoses     Well adult exam              Discussion  All questions have been answered to her satisfaction  RTO for APE or sooner if needed    Subjective     HPI   Renetta Gerard is a 28 y o  female who presents as a new patient for annual well woman exam  Patient recently moved here from [place] to help her mother who has cancer  LMP- 5/2/2022  Periods are regular  No excessive bleeding; No intermenstrual bleeding or spotting; Cramps are tolerable  Patient had previously taken OCPs for acne which she reports she was on for 10 years beginning when she was a teenager  She then switched to spironolactone which she states she has been on for the past 5 years and has been working well  However, she is considering  restarting a hormonal form of birth control such as an OCP or an IUD for contraception and acne treatment  She reports occasional vaginal itching  Denies vulvar itching/burnin, abnormal discharge or odors   She states that for the past six months she has noticed a decrease in her urinary stream  She denies burning with urination, hematuria pain or pressure       (+) SBEs - no breast masses, asymmetry, nipple discharge or bleeding, changes in skin of breast, or breast tenderness   bilaterally    No abd/pelvic pain or HAs;     Pt is sexually active in a mutually monog sexual relationship with a male partner for 1 5 years  No issues with intercourse  She declines sti/hiv/hep testing;   Feels safe at home    Current contraception: condoms  Condom use: yes  Gardasil - has not had  (+) PCP for routine Bw/care; Last Pap - she reports 2020  History of abnormal Pap smear: no    Review of Systems   Respiratory: Negative for shortness of breath  Cardiovascular: Negative for chest pain  Gastrointestinal: Negative for constipation and diarrhea  Genitourinary: Negative for difficulty urinating, dysuria, flank pain, genital sores, hematuria, menstrual problem, pelvic pain, urgency, vaginal bleeding, vaginal discharge and vaginal pain  Reports decreased stream strength for the past 6 months  Reports occasional vaginal itching        The following portions of the patient's history were reviewed and updated as appropriate: allergies, current medications, past family history, past medical history, past social history, past surgical history and problem list          OB History        0    Para   0    Term   0       0    AB   0    Living   0       SAB   0    IAB   0    Ectopic   0    Multiple   0    Live Births   0                 History reviewed  No pertinent past medical history  History reviewed  No pertinent surgical history      Family History   Problem Relation Age of Onset    Throat cancer Mother     Heart attack Father     Colon cancer Maternal Grandfather     Colon cancer Maternal Aunt        Social History     Socioeconomic History    Marital status: Single     Spouse name: Not on file    Number of children: Not on file    Years of education: Not on file    Highest education level: Not on file   Occupational History    Not on file   Tobacco Use    Smoking status: Never Smoker    Smokeless tobacco: Never Used   Vaping Use    Vaping Use: Never used   Substance and Sexual Activity    Alcohol use: Yes     Comment: 1-2 per week moderate    Drug use: Never    Sexual activity: Yes     Partners: Male     Birth control/protection: Condom Male   Other Topics Concern    Not on file   Social History Narrative    ** Merged History Encounter **          Social Determinants of Health     Financial Resource Strain: Not on file   Food Insecurity: Not on file   Transportation Needs: Not on file   Physical Activity: Not on file   Stress: Not on file   Social Connections: Not on file   Intimate Partner Violence: Not on file   Housing Stability: Not on file         Current Outpatient Medications:     spironolactone (ALDACTONE) 100 mg tablet, Take 100 mg by mouth daily, Disp: , Rfl:     No Known Allergies    Objective   Vitals:    05/12/22 0944   BP: 122/72   BP Location: Right arm   Patient Position: Sitting   Cuff Size: Standard   Weight: 78 kg (172 lb)     Physical Exam  Vitals reviewed  Constitutional:       Appearance: Normal appearance  HENT:      Head: Normocephalic and atraumatic  Cardiovascular:      Rate and Rhythm: Normal rate and regular rhythm  Pulmonary:      Effort: Pulmonary effort is normal       Breath sounds: Normal breath sounds  Chest:      Chest wall: No mass, lacerations, deformity, swelling, tenderness or edema  Breasts:      Right: Normal  No swelling, bleeding, inverted nipple, mass, nipple discharge, skin change, tenderness, axillary adenopathy or supraclavicular adenopathy  Left: Normal  No swelling, bleeding, inverted nipple, mass, nipple discharge, skin change, tenderness, axillary adenopathy or supraclavicular adenopathy  Abdominal:      General: Abdomen is flat  Bowel sounds are normal  There is no distension  Palpations: Abdomen is soft  There is no mass  Tenderness: There is no abdominal tenderness  There is no right CVA tenderness, left CVA tenderness or guarding  Hernia: No hernia is present  Genitourinary:     General: Normal vulva  Exam position: Supine  Pubic Area: No rash or pubic lice  Labia:         Right: No rash, tenderness, lesion or injury  Left: No rash, tenderness, lesion or injury  Urethra: No prolapse, urethral pain, urethral swelling or urethral lesion  Vagina: Normal  No signs of injury and foreign body  No vaginal discharge, erythema, tenderness, bleeding, lesions or prolapsed vaginal walls  Cervix: Discharge (thick, white/yellow chunky discharge consistent with yeast) and erythema (mild irritation) present  No eversion  Uterus: Normal  Not deviated, not enlarged, not fixed, not tender and no uterine prolapse  Adnexa: Right adnexa normal and left adnexa normal         Right: No mass, tenderness or fullness  Left: No mass, tenderness or fullness  Lymphadenopathy:      Upper Body:      Right upper body: No supraclavicular or axillary adenopathy  Left upper body: No supraclavicular or axillary adenopathy  Skin:     General: Skin is warm and dry  Neurological:      General: No focal deficit present  Mental Status: She is alert  Psychiatric:         Mood and Affect: Mood normal          There are no Patient Instructions on file for this visit

## 2022-05-17 ENCOUNTER — SOCIAL WORK (OUTPATIENT)
Dept: BEHAVIORAL/MENTAL HEALTH CLINIC | Facility: CLINIC | Age: 27
End: 2022-05-17
Payer: COMMERCIAL

## 2022-05-17 DIAGNOSIS — F43.22 ADJUSTMENT DISORDER WITH ANXIOUS MOOD: Primary | ICD-10-CM

## 2022-05-17 PROCEDURE — 90834 PSYTX W PT 45 MINUTES: CPT | Performed by: SOCIAL WORKER

## 2022-05-17 NOTE — PSYCH
This note was not shared with the patient due to this is a psychotherapy note    Psychotherapy Provided: Individual Psychotherapy 45 minutes     Length of time in session: 45 minutes, follow up in 2 week    Encounter Diagnosis     ICD-10-CM    1  Adjustment disorder with anxious mood  F43 22        Goals addressed in session: Goal 1     Pain:      none    0    Current suicide risk : Low     DATA: Met with Nora for scheduled individual session  She reported and discussed feeling well overall and denied significant shifts in mood  We identified and discussed unmet emotional needs in childhood: acknowledgment, and ale connections to everyday deficits and overcompensations  We visualized what a change in environment could bring, such as: moving out of her childhood home and writer tried to motivate her to consider exploring specific locations  ASSESSMENT: Kiran Gallagher presents with a normal mood  Her affect is normal range and intensity, appropriate  Kiran Gallagher exhibits good therapeutic rapport with this clinician  Kiran Gallagher continues to exhibit willingness to work on treatment goals and objectives  PLAN: Kiran Gallagher will return in 2 weeks for the next scheduled session  Behavioral Health Treatment Plan [facility]: Diagnosis and Treatment Plan explained to Maria Esther Vera relates understanding diagnosis and is agreeable to Treatment Plan   Yes

## 2022-05-18 LAB
CYTOLOGIST CVX/VAG CYTO: NORMAL
DX ICD CODE: NORMAL
OTHER STN SPEC: NORMAL
OTHER STN SPEC: NORMAL
PATH REPORT.FINAL DX SPEC: NORMAL
SL AMB NOTE:: NORMAL
SL AMB SPECIMEN ADEQUACY: NORMAL
SL AMB TEST METHODOLOGY: NORMAL

## 2022-05-31 ENCOUNTER — SOCIAL WORK (OUTPATIENT)
Dept: BEHAVIORAL/MENTAL HEALTH CLINIC | Facility: CLINIC | Age: 27
End: 2022-05-31
Payer: COMMERCIAL

## 2022-05-31 ENCOUNTER — CONSULT (OUTPATIENT)
Dept: DERMATOLOGY | Age: 27
End: 2022-05-31
Payer: COMMERCIAL

## 2022-05-31 VITALS — TEMPERATURE: 98.3 F | BODY MASS INDEX: 30.65 KG/M2 | WEIGHT: 173 LBS | HEIGHT: 63 IN

## 2022-05-31 DIAGNOSIS — L70.0 ACNE VULGARIS: Primary | ICD-10-CM

## 2022-05-31 DIAGNOSIS — Z00.00 WELL ADULT EXAM: ICD-10-CM

## 2022-05-31 DIAGNOSIS — F43.22 ADJUSTMENT DISORDER WITH ANXIOUS MOOD: Primary | ICD-10-CM

## 2022-05-31 PROCEDURE — 3008F BODY MASS INDEX DOCD: CPT | Performed by: PHYSICIAN ASSISTANT

## 2022-05-31 PROCEDURE — 99203 OFFICE O/P NEW LOW 30 MIN: CPT | Performed by: DERMATOLOGY

## 2022-05-31 PROCEDURE — 90834 PSYTX W PT 45 MINUTES: CPT | Performed by: SOCIAL WORKER

## 2022-05-31 RX ORDER — SPIRONOLACTONE 100 MG/1
100 TABLET, FILM COATED ORAL DAILY
Qty: 90 TABLET | Refills: 4 | Status: SHIPPED | OUTPATIENT
Start: 2022-05-31 | End: 2022-08-29

## 2022-05-31 NOTE — PROGRESS NOTES
[facility] Note     Patient Name: Kristi Boston  Encounter Date: 5/31/22     Have you been cared for by a [facility] in the last 3 years and, if so, which one? No    · Have you traveled outside of the [address] [place] [address] in the past 3 months or outside of the [facility] area in the last 2 weeks? No     May we call your Preferred Phone number to discuss your specific medical information? Yes     May we leave a detailed message that includes your specific medical information? Yes      Today's Chief Concerns:   Concern #1:  acne   Concern #2:      Past Medical History:  Have you personally ever had or currently have any of the following? · Skin cancer (such as Melanoma, Basal Cell Carcinoma, Squamous Cell Carcinoma? (If Yes, please provide more detail)- No  · Eczema: No  · Psoriasis: No  · HIV/AIDS: No  · Hepatitis B or C: No  · Tuberculosis: No  · Systemic Immunosuppression such as Diabetes, Biologic or Immunotherapy, Chemotherapy, Organ Transplantation, Bone Marrow Transplantation (If YES, please provide more detail): No  · Radiation Treatment (If YES, please provide more detail): No  · Any other major medical conditions/concerns? 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 No  · Eczema, Asthma, Hay Fever or Seasonal Allergies: No  · Psoriasis or Psoriatic Arthritis: No  · Do any other medical conditions seem to run in your family? If Yes, what condition and which relatives?   No    Current Medications:   (please update all dermatological medications before printing patient's AVS!)      Current Outpatient Medications:     spironolactone (ALDACTONE) 100 mg tablet, Take 100 mg by mouth daily, Disp: , Rfl:       Review of Systems:  Have you recently had or currently have any of the following? If YES, what are you doing for the problem? · Fever, chills or unintended weight loss: No  · Sudden loss or change in your vision: No  · Nausea, vomiting or blood in your stool: No  · Painful or swollen joints: No  · Wheezing or cough: No  · Changing mole or non-healing wound: No  · Nosebleeds: No  · Excessive sweating: No  · Easy or prolonged bleeding? No  · Over the last 2 weeks, how often have you been bothered by the following problems? · Taking little interest or pleasure in doing things: 1 - Not at All  · Feeling down, depressed, or hopeless: 1 - Not at All  · Rapid heartbeat with epinephrine:  No    · FEMALES ONLY:    · Are you pregnant or planning to become pregnant? No  · Are you currently or planning to be nursing or breast feeding? No    · Any known allergies? · No Known Allergies      Physical Exam:     Was a chaperone (Derm Clinical Assistant) present throughout the entire Physical Exam? Yes        CONSTITUTIONAL:   There were no vitals filed for this visit  PSYCH: Normal mood and affect  EYES: Normal conjunctiva  ENT: Normal lips and oral mucosa  CARDIOVASCULAR: No edema  RESPIRATORY: Normal respirations  HEME/LYMPH/IMMUNO:  No regional lymphadenopathy except as noted below in "ASSESSMENT AND PLAN BY DIAGNOSIS"    SKIN:  FULL ORGAN SYSTEM EXAM   Hair, Scalp, Ears, Face Normal except as noted below in Assessment   Neck, Cervical Chain Nodes Normal except as noted below in Assessment        Assessment and Plan by Diagnosis:    History of Present Condition:     Duration:  How long has this been an issue for you?    o  patient here for acne related issues currently on spironolactone 100 mg tabs daily   Patient stated medication has helped with acne she's interested in establishing care and to discuss whether is okay to be on the medication longer than 4 years which is about the amount of time she's been on the medication   Location Affected:  Where on the body is this affecting you?    o  face   Quality:  Is there any bleeding, pain, itch, burning/irritation, or redness associated with the skin lesion? o  denies   Severity:  Describe any bleeding, pain, itch, burning/irritation, or redness on a scale of 1 to 10 (with 10 being the worst)  o  n/a   Timing:  Does this condition seem to be there pretty constantly or do you notice it more at specific times throughout the day?    o  n/a   Context:  Have you ever noticed that this condition seems to be associated with specific activities you do?    o  n/a   Modifying Factors:    o Anything that seems to make the condition worse?    -  n/a  o What have you tried to do to make the condition better?    -  n/a   Associated Signs and Symptoms:  Does this skin lesion seem to be associated with any of the following:      ACNE VULGARIS ("COMMON ACNE")    Physical Exam:   Psychiatric/Mood:   Anatomic Location Affected:  face   Morphological Description:  o Open/Closed Comedones:  - Few ("Mild")  o Inflammatory Papules/Pustules:  - Few ("Mild")  o Nodules:  - Rare ("Almost Clear")  o Scarring:  - Rare ("Almost Clear")  o Excoriations:  - No evidence ("Clear")  o Local Skin Redness/Erythema:  - Rare ("Almost Clear")  o Local Skin Dryness/Scaling:  - Few ("Mild")  o Local Skin Dyspigmentation:  - Several ("Moderate")   Pertinent Positives:   Pertinent Negatives: Additional History of Present Condition:  Please see above    Assessment and Plan:   We reviewed the causes of acne, the kinds of acne, and the expected clinical course   We discussed treatment options ranging from over-the-counter products, topical retinoids, antibiotics, BP, hormonal therapies (OCPs/spironolactone), and isotretinoin (Accutane)   We reviewed specific over-the-counter interventions and medications   Recommended typical hygiene measures including water-based facial products, washing regularly with mild cleanser, and refraining from picking and popping any pimples   Recommended non-comedogenic sunscreen use daily   Expectations of therapy discussed  Side effects, risks and benefits of medications discussed   A comprehensive handout on Acne was provided   The phone number to call in case of questions or concerns (and instructions to stop medications in such a scenario) was provided   After lengthy discussion of etiology and treatment options, we decided to implement the following personalized treatment plan:    Based on a thorough discussion of this condition and the management approach to it (including a comprehensive discussion of the known risks, side effects and potential benefits of treatment), the patient (family) agrees to implement the following specific plan:    --------------------------------------------------------------------------------------  YOUR PERSONALIZED ACNE ACTION PLAN    [address] [facility]    1) SKIN HYGIENE:  In the shower, wash your face, chest and back gently with Cetaphil moisturizing cleanser or Dove Fragrance-free bar  Do not use a luffa or washcloth as these tend to be too irritating to acne-prone skin  Continue with spironolactone 100 mg daily as directed by previous dermatologist     Nhi Layton    1) SKIN HYGIENE:  In the shower, wash your face, chest and back gently with Cetaphil moisturizing cleanser or Dove Fragrance-free bar  Do not use a lufa or washcloth as these tend to be too irritating to acne-prone skin  2) TOPICAL RETINOID:  At 1 hour before bedtime (after washing your face and allowing the skin to completely dry), spread only a single pea-sized amount of this medication evenly over your entire face (avoiding your eyes or mouth):   Tretinoin 0 025% micro cream one hour before bedtime          REMEMBER:  Always take your acne pills with lots of water!   A pill stuck in your throat can cause significant burning and irritation  Drink a full glass of water to ensure the pill gets into your stomach  Avoid popping a pill right before bed, and stay upright for at least 1 hour after taking a pill  ACNE:  WHAT ZIT ALL ABOUT? WHY DO I HAVE ACNE/PIMPLES? Your skin is made of layers  To keep the skin from becoming dry and cracked, the skin needs oil  The oil is made in little wells in the deeper layers in the skin  People with acne have glands that make more oil and are more easily plugged, causing the glands to swell  Hormones, bacteria and your inherited tendency to have acne all play a role  The medical term for pimples is acne or acne vulgaris (vulgaris means common)  Most people get some acne  Acne does not come from being dirty  Instead, it is an expected consequence of changes that occur during normal growth and development  Hormones, bacteria, and your family's tendency to have acne may all play a role  Whiteheads or blackheads are openings of the glands (glands are the oil factories) onto the surface of the skin  Blackheads are not caused by dirt blocking the pores; instead, they result from the oxidation reaction of oil and skin in the pores with the air (like a rust reaction)  WHAT ABOUT STRESS? Stress does not cause acne but it can make it worse  Make sure you get enough sleep and daily exercise! WHAT ABOUT FOODS/DIET? Try to eat a balanced, healthy diet  Some people feel that certain foods worsen their acne  While there aren't many studies available on this question, severe dietary changes are unlikely to help your acne and may be harmful to the health of your skin  If you find that a certain food seems to aggravate your acne, you may consider avoiding that food  Discuss this with your physician! WHAT CAUSES MY ACNE?   There are four contributors to acne--the body's natural oil (sebum), clogged pores, bacteria (with the scientific name Propionibacterium acnes, or P  acnes, for short), and the body's reaction to the bacteria living in the clogged pores (which causes inflammation)  Here's what happens:     Sebum is produced in the normal oil-making glands in the deeper layers of the skin and reaches the surface through the skin's pores  An increase in certain hormones occurs around the time of puberty, and these hormones trigger the oil glands to produce increased amounts of sebum   Pores with excess oil tend to become clogged more easily   At the same time, P  acnes--one of the many types of bacteria that normally live on everyone's skin--thrives in the excess oil and causes a skin reaction (inflammation)   If a pore is clogged close to the surface, there is little inflammation  However, this results in the formation of whiteheads (closed comedones) or blackheads (open comedones) at the surface of the skin   A plug that extends to, or forms a little deeper in the pore, or one that enlarges or ruptures may cause more inflammation  The result is red bumps (papules) and pus-filled pimples (pustules)   If plugging happens in the deepest skin layer, the inflammation may be even more severe, resulting in the formation of nodules or cysts  When these types of acne heal, they may leave behind discolored areas or true scars  SKIN HYGIENE:  HOW SHOULD I [facility] MY SKIN? Acne does not come from being dirty, however, washing your face is part of taking good care of your skin and will help keep your face clear  Good skin hygiene is, therefore, critical to support any acne treatment plan  Here are several specific suggestions for practicing good skin hygiene and keeping your skin looking its best:     You should wash acne-prone skin TWICE A DAY: Once in the morning and once in the evening  This does include any showers you take that day, so do not overdo it!  Do not scrub the skin with a washcloth or loofah as these can irritate and inflame your acne   Acne does not come from dirt, so it is not necessary to scrub the skin clean  In fact, scrubbing may lead to dryness and irritation that makes the acne even worse and harder for patients to tolerate acne medications   Use a gentle facial moisturizing cleanser (Cetaphil Moisturizing Cleanser or Dove Fragrance-Free bar)  Avoid using soaps like Marlen Dotson, [address], [address], or soft/liquid soaps as these products will dry your skin   Do not use any over-the-counter acne washes without your doctor's specific instruction to do so  These products often contain salicylic acid or benzoyl peroxide  These ingredients can be helpful in clearing oil from the skin and reducing bacteria, but they may also be drying and can add to irritation   Do not use exfoliating products with microbeads or brushes as these can cause irritation to the skin   Facials and other treatments to remove, squeeze, or clean out pores are not recommended  Manipulating the skin in this way can make acne worse and can lead to severe infections and/or scarring  It also increases the likelihood that the skin will not be able to tolerate acne medications   Try not to pop pimples or pick at your acne as this can delay healing and may result in scarring or skin color changes (dark spots) that are often more noticeable than the acne itself  Picking/popping acne can also cause a serious skin infection   Wash or change your pillow case once to twice a week, especially if you use products in your hair   Wash the skin as soon as possible after playing sports or other activities that cause a lot of sweating  Also, pay attention to how your sports equipment (shoulder pads, helmet strap, etc ) might be making your acne worse   When you use makeup, moisturizer, or sunscreen make sure that these products are labeled non-comedogenic, or won't clog pores, or won't cause acne         SHOULD I TREAT MY ACNE?     There are a number of other skin conditions that can look like acne  If there is any question about the diagnosis, then the person should be evaluated by a board certified pediatric and adolescent dermatologist   A physician should examine any child with acne who is between the ages of 3and 9years of age, as acne in this mid-childhood age group is not normal and may signal an underlying problem  If a preadolescent (9to 6years of age) or adolescent (15to 25years of age) has mild acne and the condition is not bothersome to the individual, proper and regular skin care (what your doctor may call skin hygiene) may be all that is needed at this point  Many people do, however, need specific acne medications to help their skin look and feel its best  Your doctor will tell you if you are one of these people  If so, you may be advised to use an over-the-counter or prescription medication that is applied to the skin (a topical medication) or if the addition of an oral medication (a medication taken by [employer]) is needed  The good news is that the medications work well when used properly! Some specific factors that may influence the choice of acne therapy include:     Severity  The number and type of skin lesions (papules or comedones) and the degree of inflammation (mild, moderate or severe)   Scarring  Scarring is most common when acne is severe, but it can happen even in children with mild acne   Impact  If a child is experiencing emotional complications because of the acne or is experiencing negative comments from other children   Cost of the acne medications  An acne expert can help to keep out of pocket costs to a minimum by utilizing the correct medications and the least expensive options   The patient's skin type (oily versus dry or combination skin, for example)   Potential side effects of the medication   The ease or overall complexity of the treatment plan or medication      WHAT ACNE TREATMENTS ARE AVAILABLE? Medications for acne try to stop the formation of new pimples by reducing or removing the oil, bacteria, and other things (like dead skin cells) that clog the pores  They can also decrease the inflammation or irritation response of the skin to bacteria  It may take from 6 to 8 weeks (about 2 months!) before you see any improvement and know if the medication is effective  It takes the layers of skin this long to regenerate  Remember, these medications do not cure the condition--the acne improves because of the medication  Therefore, treatment must be continued in order to prevent the return of acne lesions  There are many types of acne treatments  Some are applied to the skin (topical medications) and some are taken by mouth (oral medications)  In most cases of mild acne, the doctor will start with a topical medication  There are many different topical medications that are helpful for acne  If acne is more severe and it does not respond adequately to a topical medication, or if it covers large body surface areas such as the back and/or chest, oral antibiotics such as Doxycycline or Minocycline and/or oral hormone therapy such as Oral Contraceptive Pills or Spironolactone may be prescribed  In the most severe cases, isotretinoin (Accutane) may be used  In general, it is usually best to start with acne medications that are least likely to cause side effects but are at the same time capable of addressing the specific causes for the acne  Some patients have a good result with just one medication, but many will need to use a combination of treatments: two or more different topical agents or an oral medication plus a topical medication  Another treatment used for acne may include corticosteroid injections, which are used to help relieve pain, decrease the size, and encourage the healing of large, inflamed acne nodules   Also, dermatologists sometimes perform acne surgery, using a fine needle, a pointed blade, or an instrument known as a comedone extractor to mechanically clean out clogged pores  One must always weigh the risk for inducing a scar with the potential benefits of any procedure  Prior treatment with topical retinoids can loosen whiteheads and blackheads and make it easier to physically remove such lesions  Heat-based devices, and light and laser therapy are being studied to see whether there is any role for such treatments in mild to moderate acne  At this time, there is not enough evidence to make general recommendations about their use  TOPICAL ACNE MEDICATIONS    WHAT KIND OF TOPICALS ARE THERE?  Benzoyl peroxide (BP) helps to fight inflammation and is anti-microbial (kills bacteria, viruses, and other microorganisms) and is believed to help prevent resistance of bacteria to topical antibiotics  A benzoyl peroxide wash may be recommended for use on large areas such as the chest and/or back  Mild irritation and dryness are common when first using benzoyl peroxide-containing products  Be careful because benzoyl peroxide can bleach towels and clothing!  Retinoids (such as adapalene, tretinoin, or tazarotene) unplug the oil glands by helping peel away the layers of skin and other things plugging the opening of the glands  Mild irritation and dryness are common when first using these products  Facial waxing and other skin procedures can lead to excessive irritation and should be avoided during retinoid therapy   Antibiotics fight bacteria and help decrease inflammation  Topical antibiotics commonly used in acne include clindamycin, erythromycin, and combination agents (such as clindamycin/benzoyl peroxide or erythromycin/benzoyl peroxide)  Mild irritation and dryness are common when first using these products  Typically, topical antibiotics should not be used alone as treatment for acne     Other topical agents include salicylic acid, azelaic acid, dapsone, and sulfacetamide  Mild irritation and dryness can also occur when first using these products  USING YOUR TOPICAL TREATMENTS LIKE A PRO   Apply topical medications only to clean, dry skin  Topical medications may lead to significant dryness of the affected areas  To minimize this, wait 15-20 minutes after washing before applying your topical medication   These medications work deep in the skin to prevent new breakouts  Spot treatment of individual pimples does not do much  When applying topical medications to the face, use the 5-dot method  Start by placing a small pea-sized amount of the medication on your finger  Then, place dots in each of five locations of your face: Mid-forehead, each cheek, nose, and chin  Next, rub the medication into the entire area of skin - not just on individual pimples! Try to avoid the delicate skin around your eyes and corners of your mouth   The medications are not magic! They take weeks if not months to work  Be patient and use your medicine on a daily basis or as directed for six weeks before asking if your skin looks better  Try not to miss more than one or two days each week when using your medications   If you are starting a new medication, then try using it every other night or even every third night   Gradually work up to [employer] a day    This will give your skin time to adjust    The same medications often come in various forms or formulations: Creams, ointments, lotions, gels, microspheres, or foams  Use the formulation that has been recommended and don't switch to other forms unless instructed  Some forms (such as alcohol based gels) may be more drying and less tolerable for certain skin types   Sometimes individual medications are not as effective as a combination of two or more agents  The doctor may need to try several medications or combinations before finding the one that is best for that patient     Moisturizer, sunscreen, and make-up may be used in conjunction with topical acne medications  In general, acne medications are applied first so they may directly contact the skin  Ask your physician to review specific application instructions!  It is especially important to always use sunscreen when using a topical retinoid or oral antibiotic  These drugs can make your skin more sensitive to the sun  In general, sunscreen gets applied AFTER any acne medications   Don't stop using your acne medications just because your acne got better  Remember, the acne is better because of the medication, and prevention is the key to treatment  HAVING PROBLEMS WITH ANY OF YOUR TREATMENTS? You should not be able to see any of the medicines on your face  If you can see a white film on your skin after you apply the medication, there is too much medicine in that area and you need to apply a thinner coat and make sure it is spread evenly on your face  If your skin gets too dry, you can apply a light (non-comedogenic) moisturizer on top of your medicine or you may switch to using the medicine every other day instead of every day  If your skin is still too irritated, you may need to switch to a milder medication  If your skin is red and very itchy, you may be allergic to the medication and you should stop using it  COMMON POSSIBLE SIDE EFFECTS OF MEDICATIONS     Retinoids - dryness, redness, increased sun sensitivity   Benzoyl peroxide - drying, redness, bleaching of clothes, towels and sheets, allergy  WHEN AND WHERE TO CALL WITH CONCERNS  We are here to help! If you experience any unusual symptoms, then stop taking or using the medication and call our office at [phone] (SKIN)  It is better to be safe than to be sorry!         Scribe Attestation    I,:  Chon Graham am acting as a scribe while in the presence of the attending physician :       I,:  Tabitha Puentes MD personally performed the services described in this documentation as scribed in my presence :

## 2022-05-31 NOTE — PSYCH
This note was not shared with the patient due to this is a psychotherapy note    Psychotherapy Provided: Individual Psychotherapy 45 minutes     Length of time in session: 45 minutes, follow up in 2 week    Encounter Diagnosis     ICD-10-CM    1  Adjustment disorder with anxious mood  F43 22        Goals addressed in session: Goal 1     Pain:      none    0    Current suicide risk : Low     DATA: Met with Nora for scheduled individual session  She reported feeling well overall and denied significant shifts in mood  We discussed value based decision making as opposed to fear based, and leaning into acceptance as the vehicle to move between both  We addressed all or nothing thinking patterns and writer proposed a schedule to help keep Lola Martin accountable to her work related goals  ASSESSMENT: Lola Martin presents with a normal mood  Her affect is normal range and intensity, appropriate  Lola Martin exhibits good therapeutic rapport with this clinician  Lola Martin continues to exhibit willingness to work on treatment goals and objectives  PLAN: Lola Martin will return in 2 weeks for the next scheduled session  Between sessions, Lola Martin will schedule a few days a week wherein she can work on work related goals, and will report back during the next session re: successes and barriers  Behavioral Health Treatment Plan [facility]: Diagnosis and Treatment Plan explained to Daniele Pimentel relates understanding diagnosis and is agreeable to Treatment Plan   Yes

## 2022-06-14 ENCOUNTER — SOCIAL WORK (OUTPATIENT)
Dept: BEHAVIORAL/MENTAL HEALTH CLINIC | Facility: CLINIC | Age: 27
End: 2022-06-14
Payer: COMMERCIAL

## 2022-06-14 DIAGNOSIS — F43.22 ADJUSTMENT DISORDER WITH ANXIOUS MOOD: Primary | ICD-10-CM

## 2022-06-14 PROCEDURE — 90834 PSYTX W PT 45 MINUTES: CPT | Performed by: SOCIAL WORKER

## 2022-06-14 NOTE — PSYCH
This note was not shared with the patient due to this is a psychotherapy note    Psychotherapy Provided: Individual Psychotherapy 45 minutes     Length of time in session: 45 minutes, follow up in 2 week    Encounter Diagnosis     ICD-10-CM    1  Adjustment disorder with anxious mood  F43 22        Goals addressed in session: Goal 1     Pain:      none    0    Current suicide risk : Low     DATA: Met with Nora for scheduled individual session  She reported feeling well stating that she is making adjustments with regard to limit setting in both her professional and personal life  Being direct is at times a challenge but she is working on responding to herself in the moment  She gave examples of this and together we processed the role of anger and apathy  ASSESSMENT: Nayeli Mosquera presents with a normal mood  Her affect is normal range and intensity, appropriate  Nayeli Mosquera exhibits good therapeutic rapport with this clinician  Nayeli Mosquera continues to exhibit willingness to work on treatment goals and objectives  PLAN: Nayeli Mosquera will return in 2 weeks for the next scheduled session  Between sessions, Nayeli Mosquera will practice self care and CBT skills and will report back during the next session re: successes and barriers  Behavioral Health Treatment Plan [facility]: Diagnosis and Treatment Plan explained to [employer]  relates understanding diagnosis and is agreeable to Treatment Plan   Yes

## 2022-06-21 ENCOUNTER — OFFICE VISIT (OUTPATIENT)
Dept: FAMILY MEDICINE CLINIC | Facility: CLINIC | Age: 27
End: 2022-06-21
Payer: COMMERCIAL

## 2022-06-21 ENCOUNTER — PATIENT MESSAGE (OUTPATIENT)
Dept: FAMILY MEDICINE CLINIC | Facility: CLINIC | Age: 27
End: 2022-06-21

## 2022-06-21 ENCOUNTER — TELEPHONE (OUTPATIENT)
Dept: FAMILY MEDICINE CLINIC | Facility: CLINIC | Age: 27
End: 2022-06-21

## 2022-06-21 VITALS
WEIGHT: 174 LBS | RESPIRATION RATE: 18 BRPM | SYSTOLIC BLOOD PRESSURE: 94 MMHG | HEIGHT: 62 IN | OXYGEN SATURATION: 98 % | HEART RATE: 81 BPM | TEMPERATURE: 97.6 F | DIASTOLIC BLOOD PRESSURE: 70 MMHG | BODY MASS INDEX: 32.02 KG/M2

## 2022-06-21 DIAGNOSIS — U07.1 COVID-19: Primary | ICD-10-CM

## 2022-06-21 DIAGNOSIS — L70.8 OTHER ACNE: ICD-10-CM

## 2022-06-21 LAB — SARS-COV-2 AG UPPER RESP QL IA: ABNORMAL

## 2022-06-21 PROCEDURE — 99213 OFFICE O/P EST LOW 20 MIN: CPT | Performed by: NURSE PRACTITIONER

## 2022-06-21 PROCEDURE — 1036F TOBACCO NON-USER: CPT | Performed by: NURSE PRACTITIONER

## 2022-06-21 PROCEDURE — 3008F BODY MASS INDEX DOCD: CPT | Performed by: NURSE PRACTITIONER

## 2022-06-21 NOTE — PROGRESS NOTES
COVID-19 Outpatient Progress Note    Assessment/Plan:    Problem List Items Addressed This Visit        Musculoskeletal and Integument    Other acne      Other Visit Diagnoses     [id number]    -  Primary       1  COVID-19    2  Other acne  Comments:  complaint with aldactone    Supportive care for viral illness discussed and advised  will follow up for any worsening and no improvement    Disposition:     Patient has COVID-19 infection  Based off CDC guidelines, they were recommended to isolate for 5 days from the date of the positive test  If they remain asymptomatic, isolation may be ended followed by 5 days of wearing a mask when around othes to minimize risk of infecting others  If they have a fever, continue to stay home until fever resolves for at least 24 hours  I recommended continued isolation until at least 24 hours have passed since recovery defined as resolution of fever without the use of fever-reducing medications AND improvement in COVID symptoms AND 10 days have passed since onset of symptoms (or 10 days have passed since date of first positive viral diagnostic test for asymptomatic patients)  Discussed symptom directed medication options with patient  Discussed vitamin D, vitamin C, and/or zinc supplementation with patient  Patient meets criteria for PAXLOVID and they have been counseled appropriately according to EUA documentation released by the FDA  After discussion, patient agrees to treatment  Tedra Peto is an investigational medicine used to treat mild-to-moderate COVID-19 in adults and children (15years of age and older weighing at least 80 pounds (40 kg)) with positive results of direct SARS-CoV-2 viral testing, and who are at high risk for progression to severe COVID-19, including hospitalization or death  PAXLOVID is investigational because it is still being studied   There is limited information about the safety and effectiveness of using PAXLOVID to treat people with mild-to-moderate COVID-19  The FDA has authorized the emergency use of PAXLOVID for the treatment of mild-tomoderate COVID-19 in adults and children (15years of age and older weighing at least 80 pounds (40 kg)) with a positive test for the virus that causes COVID-19, and who are at high risk for progression to severe COVID-19, including hospitalization or death, under an EUA  What should I tell my healthcare provider before I take PAXLOVID? Tell your healthcare provider if you:  - Have any allergies  - Have liver or kidney disease  - Are pregnant or plan to become pregnant  - Are breastfeeding a child  - Have any serious illnesses    Tell your healthcare provider about all the medicines you take, including prescription and over-the-counter medicines, vitamins, and herbal supplements  Some medicines may interact with PAXLOVID and may cause serious side effects  Keep a list of your medicines to show your healthcare provider and pharmacist when you get a new medicine  You can ask your healthcare provider or pharmacist for a list of medicines that interact with PAXLOVID  Do not start taking a new medicine without telling your healthcare provider  Your healthcare provider can tell you if it is safe to take PAXLOVID with other medicines  Tell your healthcare provider if you are taking combined hormonal contraceptive  PAXLOVID may affect how your birth control pills work  Females who are able to become pregnant should use another effective alternative form of contraception or an additional barrier method of contraception  Talk to your healthcare provider if you have any questions about contraceptive methods that might be right for you  How do I take PAXLOVID? PAXLOVID consists of 2 medicines: nirmatrelvir and ritonavir  - Take 2 pink tablets of nirmatrelvir with 1 white tablet of ritonavir by mouth 2 times each day (in the morning and in the evening) for 5 days   For each dose, take all 3 tablets at the same time  - If you have kidney disease, talk to your healthcare provider  You may need a different dose  - Swallow the tablets whole  Do not chew, break, or crush the tablets  - Take PAXLOVID with or without food  - Do not stop taking PAXLOVID without talking to your healthcare provider, even if you feel better  - If you miss a dose of PAXLOVID within 8 hours of the time it is usually taken, take it as soon as you remember  If you miss a dose by more than 8 hours, skip the missed dose and take the next dose at your regular time  Do not take 2 doses of PAXLOVID at the same time  - If you take too much PAXLOVID, call your healthcare provider or go to the nearest hospital emergency room right away  - If you are taking a ritonavir- or cobicistat-containing medicine to treat hepatitis C or Human Immunodeficiency Virus (HIV), you should continue to take your medicine as prescribed by your healthcare provider   - Talk to your healthcare provider if you do not feel better or if you feel worse after 5 days  Who should generally not take PAXLOVID? Do not take PAXLOVID if:  You are allergic to nirmatrelvir, ritonavir, or any of the ingredients in PAXLOVID  You are taking any of the following medicines:  - Alfuzosin  - Pethidine, piroxicam, propoxyphene  - Ranolazine  - Amiodarone, dronedarone, flecainide, propafenone, quinidine  - Colchicine  - Lurasidone, pimozide, clozapine  - Dihydroergotamine, ergotamine, methylergonovine  - Lovastatin, simvastatin  - Sildenafil (Revatio®) for pulmonary arterial hypertension (PAH)  - Triazolam, oral midazolam  - Apalutamide  - Carbamazepine, phenobarbital, phenytoin  - Rifampin  - St  Byrons Wort (hypericum perforatum)    What are the important possible side effects of PAXLOVID? Possible side effects of PAXLOVID are:  - Liver Problems   Tell your healthcare provider right away if you have any of these signs and symptoms of liver problems: loss of appetite, yellowing of your skin and the whites of eyes (jaundice), dark-colored urine, pale colored stools and itchy skin, stomach area (abdominal) pain  - Resistance to HIV Medicines  If you have untreated HIV infection, PAXLOVID may lead to some HIV medicines not working as well in the future  - Other possible side effects include: altered sense of taste, diarrhea, high blood pressure, or muscle aches    These are not all the possible side effects of PAXLOVID  Not many people have taken PAXLOVID  Serious and unexpected side effects may happen  Zoltan Gong is still being studied, so it is possible that all of the risks are not known at this time  What other treatment choices are there? Like Benson Jean may allow for the emergency use of other medicines to treat people with COVID-19  Go to https://[employer]/ for information on the emergency use of other medicines that are authorized by FDA to treat people with COVID-19  Your healthcare provider may talk with you about clinical trials for which you may be eligible  It is your choice to be treated or not to be treated with PAXLOVID  Should you decide not to receive it or for your child not to receive it, it will not change your standard medical care  What if I am pregnant or breastfeeding? There is no experience treating pregnant women or breastfeeding mothers with PAXLOVID  For a mother and unborn baby, the benefit of taking PAXLOVID may be greater than the risk from the treatment  If you are pregnant, discuss your options and specific situation with your healthcare provider  It is recommended that you use effective barrier contraception or do not have sexual activity while taking PAXLOVID  If you are breastfeeding, discuss your options and specific situation with your healthcare provider  How do I report side effects with PAXLOVID?     Contact your healthcare provider if you have any side effects that bother you or do not go away  Report side effects to FDA MedWatch at www fda gov/medwatch or call 4-419-QKS4862 or you can report side effects to [facility]  at the contact information provided below  Website Fax number Telephone number   [employer] [phone]     How should I store [address]? Store PAXLOVID tablets at room temperature between 68°F to 77°F (20°C to 25°C)  Full fact sheet for patients, parents, and caregivers can be found at: [employer]    I have spent 12 minutes directly with the patient  Discussed about paxlovid in detail with possible adverse effects and as patient does not have any comorbodities and not immunocompromised then decided not to take paxlovid and will continue with supportive care  Increase fluid intake, saline nasal rinses, and hot tea with honey and lemon  Cool air humidification can be helpful as well  May take Tylenol as needed for pain or fevers  Mucinex D for sinus congestion or Coricidin HBP if you have high blood pressure or a heart condition  Mucinex or Robitussin DM are effective for cough and chest congestion  Supportive care discussed and advised  Advised to RTO for any worsening and no improvement  Follow up for no improvement and worsening of conditions  Patient advised and educated when to see immediate medical care  Encounter provider BERNARD Askew    Provider located at  O  [address] [address]    Recent Visits  No visits were found meeting these conditions    Showing recent visits within past 7 days and meeting all other requirements  Today's Visits  Date Type Provider Dept   06/21/22 Telephone Kira Gates, [address] [place] [address]   06/21/22 Office Visit [facility] today's visits and meeting all other requirements  Future Appointments  No visits were found meeting these conditions  Showing future appointments within next 150 days and meeting all other requirements     Subjective:   Zacarias Zee is a 28 y o  female who is concerned about COVID-19  Patient's symptoms include sore throat and myalgias  Patient denies fever, chills, fatigue, malaise, congestion, rhinorrhea, anosmia, loss of taste, cough, shortness of breath, chest tightness, abdominal pain, nausea, vomiting, diarrhea and headaches  - Date of symptom onset: 6/18/2022      COVID-19 vaccination status: Fully vaccinated with booster    Exposure:   Contact with a person who is under investigation (PUI) for or who is positive for COVID-19 within the last 14 days?: No    Hospitalized recently for fever and/or lower respiratory symptoms?: No      Currently a healthcare worker that is involved in direct patient care?: No      Works in a special setting where the risk of COVID-19 transmission may be high? (this may include long-term care, correctional and [facility] facilities; homeless shelters; assisted-living facilities and group homes ): No      Resident in a special setting where the risk of COVID-19 transmission may be high? (this may include long-term care, correctional and [facility] facilities; homeless shelters; assisted-living facilities and group homes ): No      Patient tested positive for covid-19 on 6/20/2022    Lab Results   Component Value Date    SARSCOV2 Negative 12/22/2021    [facility] [address] [facility] Not Detected 03/15/2021    [facility] Positive (Patient Reported) (A) 06/21/2022     History reviewed  No pertinent past medical history  History reviewed  No pertinent surgical history    Current Outpatient Medications   Medication Sig Dispense Refill    spironolactone (ALDACTONE) 100 mg tablet Take 1 tablet (100 mg total) by mouth daily 90 tablet 4    tretinoin (RETIN-A) 0 025 % cream Apply topically daily at bedtime Spread one pea-sized amount of medication over entire face about one hour before bedtime  45 g 2     No current facility-administered medications for this visit  No Known Allergies    Review of Systems   Constitutional: Negative for chills, fatigue and fever  HENT: Positive for sore throat  Negative for congestion and rhinorrhea  Respiratory: Negative for cough, chest tightness and shortness of breath  Gastrointestinal: Negative for abdominal pain, diarrhea, nausea and vomiting  Musculoskeletal: Positive for myalgias  Neurological: Negative for headaches  Objective:    Vitals:    06/21/22 1324   BP: 94/70   Pulse: 81   Resp: 18   Temp: 97 6 °F (36 4 °C)   SpO2: 98%   Weight: 78 9 kg (174 lb)   Height: 5' 2 25" (1 581 m)       Physical Exam  HENT:      Head: Normocephalic  Right Ear: External ear normal       Left Ear: External ear normal       Nose: Nose normal    Eyes:      Conjunctiva/sclera: Conjunctivae normal    Cardiovascular:      Rate and Rhythm: Normal rate and regular rhythm  Heart sounds: Normal heart sounds  Pulmonary:      Effort: Pulmonary effort is normal       Breath sounds: Normal breath sounds  Musculoskeletal:      Cervical back: Normal range of motion  Skin:     General: Skin is warm and dry  Findings: No rash  Neurological:      Mental Status: She is alert and oriented to person, place, and time  Psychiatric:         Mood and Affect: Mood normal          Behavior: Behavior normal          Thought Content: Thought content normal          Judgment: Judgment normal          VIRTUAL VISIT DISCLAIMER    Gardenia Jameson verbally agrees to participate in [employer]  Pt is aware that [employer] could be limited without vital signs or the ability to perform a full hands-on physical Hollyanita Jenkinschuckie understands she or the provider may request at any time to terminate the video visit and request the patient to seek care or treatment in person

## 2022-06-21 NOTE — TELEPHONE ENCOUNTER
----- Message from Sheldon Guillermo sent at 6/21/2022 11:01 AM EDT -----  Regarding: Covid Positive - Rx suggested? Hi Dr Jhaveri Just tested positive for Covid-19, and am reaching out to inform you and see if you think any additional treatment is recommended  Including more context below:     Symptom started on 6/19     Was PCR tested via [facility] on 6/19 - positive results on 6/21    Symptoms include:   sore throat   dry couch   congestion   body aches   loss of taste     Have not yet had a fever  Treating with MucinexD and Ibuprofen  It really just feels like a bad cold to me, but wanted to make you aware  Not expecting to perscripted anything       Thanks,   [employer]

## 2022-06-21 NOTE — PATIENT INSTRUCTIONS
COVID-19 (Coronavirus Disease 2019)   AMBULATORY CARE:   What you need to know about COVID-19:  COVID-19 is the disease caused by a coronavirus first discovered in December 2019  Coronaviruses generally cause upper respiratory (nose, throat, and lung) infections, such as a cold  The 2019 virus spreads quickly and easily  It can be spread starting 2 to 3 days before symptoms even begin  What you need to know about variants: The virus has changed into several new forms (called variants) since it was discovered  The variants may be more contagious (easily spread) than the original form  Some may also cause more severe illness than others  Signs and symptoms of COVID-19  may not develop  Signs and symptoms usually start about 5 days after infection but can take 2 to 14 days  You may feel like you have the flu or a bad cold  Some signs and symptoms go away in a few days  Others can last weeks, months, or possibly years  You may have any of the following:  A cough    Shortness of breath or trouble breathing that may become severe    A fever    Chills that might include shaking    Muscle pain, body aches, or a headache    A sore throat    Sudden changes or loss of your taste or smell    Feeling mentally and physically tired (fatigue)    Congestion (stuffy head and nose), or a runny nose    Diarrhea, nausea, or vomiting    Call your local emergency number (911 in the [address]) if:   You have trouble breathing or shortness of breath at rest     You have chest pain or pressure that lasts longer than 5 minutes  You become confused or hard to wake  Your lips or face are blue  Seek care immediately if:   You have a fever of 104°F (40°C) or higher  Call your doctor if:   You have symptoms of COVID-19  You have questions or concerns about your condition or care  How COVID-19 is diagnosed:  Testing is offered at many sites  You may need to quarantine until you get your results   Any of the following tests may be used:  A viral PCR test  shows if you have a current infection  A sample is taken from your nose or throat with a swab  You may need to wait 1 or more days to get the test results  An antigen test  shows if you have a protein from the COVID-19 virus  This test is often called a rapid test because the results can be available in 30 minutes or less  An antibody test  shows if you had a recent or past infection  Blood samples are used for this test  Antibodies are made by your immune system to fight the virus that causes COVID-19  Antibodies form 1 to 3 weeks after you are infected  This test is not used to show if you are immune to the virus  A CT, MRI, ultrasound, or x-ray  may be used to check for complications of [id number]  These may include pneumonia, blood clots, or other complications  Treatment:   Mild symptoms  may get better on their own  Some treatments have emergency use authorization (EUA)  Examples include monoclonal antibodies and convalescent plasma  These may be given to help prevent worsening of your symptoms  You may also need any of the following:    Decongestants  help reduce nasal congestion and help you breathe more easily  If you take decongestant pills, they may make you feel restless or cause problems with your sleep  Do not use decongestant sprays for more than a few days  Cough suppressants  help reduce coughing  Ask your healthcare provider which type of cough medicine is best for you  To soothe a sore throat,  gargle with warm salt water, or use throat lozenges or a throat spray  Drink more liquids to thin and loosen mucus and to prevent dehydration  NSAIDs or acetaminophen  can help lower a fever and relieve body aches or a headache  Follow directions  If not taken correctly, NSAIDs can cause kidney damage and acetaminophen can cause liver damage      Severe or life-threatening symptoms  are treated in the hospital  You may need any of the following:     Medicines  may be given to fight the virus or treat inflammation  Blood thinners  help prevent or treat blood clots  If you have a deep vein thrombosis (DVT) or pulmonary embolism (PE), you may need to use blood thinners for at least 3 months  Extra oxygen  may be given if you have respiratory failure  This means your lungs cannot get enough oxygen into your blood and out to your organs  A ventilator  may be used to help you breathe  What you need to know about health problems the virus may cause: You may develop long-term health problems caused by the virus  Your risk is higher if you are 65 or older  A weak immune system, obesity, diabetes, chronic kidney disease, or a heart or lung condition can also increase your risk  Your risk is also higher if you are a current or former cigarette smoker  COVID-19 can lead to any of the following:  Multisymptom inflammatory syndrome in adults (MIS-A) or in children (MIS-C), causing inflammation in the heart, digestive system, skin, or brain    Shortness of breath, serious lower respiratory conditions, such as pneumonia or acute respiratory distress syndrome (ARDS)    Blood clots or blood vessel damage    Organ damage from a lack of oxygen or from blood clots    Sleep problems    Problems thinking clearly, remembering information, or concentrating    Mood changes, depression, or anxiety    Long-term problems tasting or smelling    Loss of appetite and weight loss    Nerve pain    Fatigue (feeling mentally and physically tired)    What you need to know about COVID-19 vaccines:  Healthcare providers recommend a COVID-19 vaccine, even if you have already had COVID-19  You are considered fully vaccinated against COVID-19 two weeks after the final dose of any COVID-19 vaccine  Let your healthcare provider know when you have received the final dose of the vaccine  Make a copy of your vaccination card  Keep the original with you in case you need to show it   Keep the copy in a safe place   COVID-19 vaccines are given as a shot in 1 or 2 doses  Vaccination is recommended for everyone 5 years or older  One 2-dose vaccine is fully approved  for those 16 years or older  This vaccine also has an emergency use authorization (EUA) for children 11to 13years old  No vaccine is currently available for children younger than 5 years  A booster (additional) dose  is given to help the immune system continue to protect against severe COVID-19  A booster is recommended for all adults 18 or older  The booster can be a different brand of the COVID-19 vaccine than you originally received  The timing for the booster depends on the type of vaccine you received:    1-dose vaccine: The booster is given at least 2 months after you received the vaccine  2-dose vaccine: The booster is given at least 5 or 6 months after the second dose  A booster can be given to adolescents 15to 16years old  Only 1 COVID-19 vaccine has this EUA  The booster is given at least 5 months after the second dose of the original vaccine series  A booster is recommended for immunocompromised children 11to 6years old  Only 1 COVID-19 vaccine has this EUA  The booster is given 28 days after the second dose  Continue social distancing and other measures, even after you get the vaccine  Although it is not common, you can become infected after you get the vaccine  You may also be able to pass the virus to others without knowing you are infected  After you get the vaccine, check local, national, and international travel rules  You may need to be tested before you travel  Some countries require proof of a negative test before you travel  You may also need to quarantine after you return  Medicine may be given to prevent infection  The medicine can be given if you are at high risk for infection and cannot get the vaccine  It can also be given if your immune system does not respond well to the vaccine      How the 2019 coronavirus spreads:   Droplets are the main way all coronaviruses spread  The virus travels in droplets that form when a person talks, sings, coughs, or sneezes  The droplets can also float in the air for minutes or hours  Infection happens when you breathe in the droplets or get them in your eyes or nose  Close personal contact with an infected person increases your risk for infection  This means being within 6 feet (2 meters) of the person for at least 15 minutes over 24 hours  Person-to-person contact can spread the virus  For example, a person with the virus on his or her hands can spread it by shaking hands with someone  The virus can stay on objects and surfaces for up to 3 days  You may become infected by touching the object or surface and then touching your eyes or mouth  Help lower the risk for COVID-19:   Wash your hands often throughout the day  Use soap and water  Rub your soapy hands together, lacing your fingers, for at least 20 seconds  Rinse with warm, running water  Dry your hands with a clean towel or paper towel  Use hand  that contains alcohol if soap and water are not available  Teach children how to wash their hands and use hand   Cover sneezes and coughs  Turn your face away and cover your mouth and nose with a tissue  Throw the tissue away  Use the bend of your arm if a tissue is not available  Then wash your hands well with soap and water or use hand   Teach children how to cover a cough or sneeze  Wear a face covering (mask) when needed  Use a cloth covering with at least 2 layers  You can also create layers by putting a cloth covering over a disposable non-medical mask  Cover your mouth and your nose  Follow worldwide, national, and local social distancing guidelines  Keep at least 6 feet (2 meters) between you and others  Try not to touch your face    If you get the virus on your hands, you can transfer it to your eyes, nose, or mouth and become infected  You can also transfer it to objects, surfaces, or people  Clean and disinfect high-touch surfaces and objects often  Use disinfecting wipes, or make a solution of 4 teaspoons of bleach in 1 quart (4 cups) of water  Ask about other vaccines you may need  Get the influenza (flu) vaccine as soon as recommended each year, usually starting in September or October  Get the pneumonia vaccine if recommended  Your healthcare provider can tell you if you should also get other vaccines, and when to get them  Follow social distancing guidelines:  National and local social distancing rules vary  Rules and restrictions may change over time as restrictions are lifted  The following are general guidelines:  Stay home if you are sick or think you may have COVID-19  It is important to stay home if you are waiting for a testing appointment or for test results  Avoid close physical contact with anyone who does not live in your home  Do not shake hands with, hug, or kiss a person as a greeting  If you must use public transportation (such as a bus or subway), try to sit or stand away from others  Wear your face covering  Avoid in-person gatherings and crowds  Attend virtually if possible  Follow up with your doctor as directed:  Write down your questions so you remember to ask them during your visits  For more information:   [facility]  [address] , [address]  Phone: 7- 640 - [phone]  Web Address: [employer] com br    © Copyright [employer] 2022 Information is for End User's use only and may not be sold, redistributed or otherwise used for commercial purposes  All illustrations and images included in CareNotes® are the copyrighted property of [email or web address]  or Elizabeth Gutierrez   The above information is an  only  It is not intended as medical advice for individual conditions or treatments   Talk to your doctor, nurse or pharmacist before following any medical regimen to see if it is safe and effective for you

## 2022-06-21 NOTE — TELEPHONE ENCOUNTER
From: Slime Rogers  To: Mary Grace Love MD  Sent: 6/21/2022 11:01 AM EDT  Subject: Covid Positive - Rx suggested? Hi Dr Alvester Phoenix tested positive for Covid-19, and am reaching out to inform you and see if you think any additional treatment is recommended  Including more context below:     Symptom started on 6/19     Was PCR tested via [facility] on 6/19 - positive results on 6/21    Symptoms include:   sore throat   dry couch   congestion   body aches   loss of taste     Have not yet had a fever  Treating with MucinexD and Ibuprofen  It really just feels like a bad cold to me, but wanted to make you aware  Not expecting to perscripted anything       Thanks,   [employer]

## 2022-06-27 ENCOUNTER — TELEPHONE (OUTPATIENT)
Dept: PSYCHIATRY | Facility: CLINIC | Age: 27
End: 2022-06-27

## 2022-07-14 ENCOUNTER — SOCIAL WORK (OUTPATIENT)
Dept: BEHAVIORAL/MENTAL HEALTH CLINIC | Facility: CLINIC | Age: 27
End: 2022-07-14
Payer: COMMERCIAL

## 2022-07-14 DIAGNOSIS — F43.22 ADJUSTMENT DISORDER WITH ANXIOUS MOOD: Primary | ICD-10-CM

## 2022-07-14 PROCEDURE — 90834 PSYTX W PT 45 MINUTES: CPT | Performed by: SOCIAL WORKER

## 2022-07-14 NOTE — PSYCH
This note was not shared with the patient due to this is a psychotherapy note    Psychotherapy Provided: Individual Psychotherapy 45 minutes     Length of time in session: 45 minutes, follow up in 2 week    Encounter Diagnosis     ICD-10-CM    1  Adjustment disorder with anxious mood  F43 22        Goals addressed in session: Goal 1     Pain:      none    0    Current suicide risk : Low     DATA: Met with Nora for scheduled individual session  She reported feeling well overall and denied significant shifts in mood  She had Covid 19 a couple of weeks ago but has thankfully recovered fully  She has considered her job situation and has decided to give her employer an ultimatum to remain working from home so that she can then relocated to [place]  It is her hope to then research different work situations available to her and ultimately leave her current job  We discussed coping startegies to help in this process including effective communication, setting a deadline for herself, and remembering her "why"  We acknowledged her tendency to people please and writer encouraged that she journal her thoughts and be mindful of her breathing  She was agreeable to all of this  ASSESSMENT: Enma Diaz presents with a normal mood  Her affect is normal range and intensity, appropriate  Enma Diaz exhibits good therapeutic rapport with this clinician  Enma Diaz continues to exhibit willingness to work on treatment goals and objectives  PLAN: Enma Diaz will return in 2 weeks for the next scheduled session  Between sessions, Enma Diaz will practice self care and have a difficult conversation with her employer and will report back during the next session re: successes and barriers  Behavioral Health Treatment Plan [facility]: Diagnosis and Treatment Plan explained to [place] Jennifer salazar understanding diagnosis and is agreeable to Treatment Plan   Yes

## 2022-07-26 ENCOUNTER — SOCIAL WORK (OUTPATIENT)
Dept: BEHAVIORAL/MENTAL HEALTH CLINIC | Facility: CLINIC | Age: 27
End: 2022-07-26
Payer: COMMERCIAL

## 2022-07-26 DIAGNOSIS — F43.22 ADJUSTMENT DISORDER WITH ANXIOUS MOOD: Primary | ICD-10-CM

## 2022-07-26 PROCEDURE — 90834 PSYTX W PT 45 MINUTES: CPT | Performed by: SOCIAL WORKER

## 2022-07-26 NOTE — PSYCH
This note was not shared with the patient due to this is a psychotherapy note    Psychotherapy Provided: Individual Psychotherapy 45 minutes     Length of time in session: 45 minutes, follow up in 2 week    Encounter Diagnosis     ICD-10-CM    1  Adjustment disorder with anxious mood  F43 22        Goals addressed in session: Goal 1     Pain:      none    0    Current suicide risk : Low     DATA: Met with Nora for scheduled individual session  She reported feeling well overall and denied significant shifts in mood  She shared having intentional difficult conversations with her mom and boss wherein she discussed wanting to stay remote so she can relocate to [place]  Her mom was supportive and doubly pleased that she came to this decision by herself and her employer was supportive as well  We acknowledged the unmet need of being given emotional support and reassurance rather than being told that she needs to figure things out on her own  She is learning to recognize that self sufficiency isn't as gratifying as it once was as she mastered that skill at an early age, but out of emotional neglect  She continues to work on taking care of herself and prioritizing her needs first      ASSESSMENT: Elza Cooper presents with a normal mood  Her affect is normal range and intensity, appropriate  Elza Cooper exhibits good therapeutic rapport with this clinician  Elza Cooper continues to exhibit willingness to work on treatment goals and objectives  Less people pleasing tendencies and more self confidence reported  PLAN: Elza Cooper will return in 2 weeks for the next scheduled session  Between sessions, Elza Cooper will practice self care and communication skills and will report back during the next session re: successes and barriers  Behavioral Health Treatment Plan [facility]: Diagnosis and Treatment Plan explained to [facility] relates understanding diagnosis and is agreeable to Treatment Plan   Yes

## 2022-08-09 ENCOUNTER — SOCIAL WORK (OUTPATIENT)
Dept: BEHAVIORAL/MENTAL HEALTH CLINIC | Facility: CLINIC | Age: 27
End: 2022-08-09
Payer: COMMERCIAL

## 2022-08-09 DIAGNOSIS — F43.22 ADJUSTMENT DISORDER WITH ANXIOUS MOOD: Primary | ICD-10-CM

## 2022-08-09 PROCEDURE — 90834 PSYTX W PT 45 MINUTES: CPT | Performed by: SOCIAL WORKER

## 2022-08-09 NOTE — PSYCH
This note was not shared with the patient due to this is a psychotherapy note    Psychotherapy Provided: Individual Psychotherapy 45 minutes     Length of time in session: 45 minutes, follow up in 2 week    Encounter Diagnosis     ICD-10-CM    1  Adjustment disorder with anxious mood  F43 22        Goals addressed in session: Goal 1     Pain:      none    0    Current suicide risk : Low     DATA: Met with Nora for scheduled individual session  She reported feeling well overall stating that she is just returning from vacation, settling back into everyday life, and spending time with friends & family as often as she can, taking on as little of a role as possible  We discussed a conversation that she had with her Helder Read a few weeks ago wherein Agustin walked away feeling like a bad friend because her Helder Read was displeased with her  We acknowledged the feelings of her inner child and she was encouraged to sit with this for a period of time rather than try to talk herself out of it  We discussed the value of simply validating her emotions as they arise rather than try to please other people  She was encouraged to journal these moments as an additional way to let out her feelings  ASSESSMENT: Agustin presents with a normal mood  Her affect is normal range and intensity, appropriate  Agustin exhibits good therapeutic rapport with this clinician  Agustin continues to exhibit willingness to work on treatment goals and objectives  PLAN: Agustin will return in 2 weeks for the next scheduled session  Between sessions, Agustin will practice identifying her feelings and will report back during the next session re: successes and barriers  Behavioral Health Treatment Plan [facility]: Diagnosis and Treatment Plan explained to Sarah Mak relates understanding diagnosis and is agreeable to Treatment Plan   Yes

## 2022-08-23 ENCOUNTER — SOCIAL WORK (OUTPATIENT)
Dept: BEHAVIORAL/MENTAL HEALTH CLINIC | Facility: CLINIC | Age: 27
End: 2022-08-23
Payer: COMMERCIAL

## 2022-08-23 DIAGNOSIS — F43.22 ADJUSTMENT DISORDER WITH ANXIOUS MOOD: Primary | ICD-10-CM

## 2022-08-23 PROCEDURE — 90834 PSYTX W PT 45 MINUTES: CPT | Performed by: SOCIAL WORKER

## 2022-08-23 NOTE — PSYCH
This note was not shared with the patient due to this is a psychotherapy note    Psychotherapy Provided: Individual Psychotherapy 45 minutes     Length of time in session: 45 minutes, follow up in 2 week    Encounter Diagnosis     ICD-10-CM    1  Adjustment disorder with anxious mood  F43 22        Goals addressed in session: Goal 1     Pain:      none    0    Current suicide risk : Low     DATA: Met with Nora for scheduled individual session  She reported feeling well stating that she has been using journaling and letter writing as a way to slow down her thoughts and connect with her feelings  She has been working on staying true to how she feels with regard to over committing herself and staying within her role as hyperindependent  She continues to pursue her move to [place] in the fall and will look for a new job then  ASSESSMENT: Juliette Franco presents with a normal mood  Her affect is normal range and intensity, appropriate  Juliette Franco exhibits good therapeutic rapport with this clinician  Juliette Franco continues to exhibit willingness to work on treatment goals and objectives  PLAN: Juliette Franco will return in 2 weeks for the next scheduled session  Between sessions, Juliette Franco will practice self care and CBT skills and will report back during the next session re: successes and barriers  Behavioral Health Treatment Plan [facility]: Diagnosis and Treatment Plan explained to Lori Gonzalez relates understanding diagnosis and is agreeable to Treatment Plan   Yes

## 2022-08-23 NOTE — BH TREATMENT PLAN
Sima Luu  1995       Date of Initial Treatment Plan: 01/25/2022  Date of Current Treatment Plan: 08/23/22    Treatment Plan Number 2    Strengths/Personal Resources for Self Care: "Good friend," have insight, relative stable mood    Diagnosis:   1  Adjustment disorder with anxious mood         Area of Needs: Life transitions      Long Term Goal 1: Develop positive coping strategies to help transition into different phases of life (job, relationships)    Target Date: 7/25/2022  Completion Date: N/A         Short Term Objectives for Goal 1: Identify top 3 values which guide decisions, discuss and acknowledge 2 triggers and cues for rising stress, learn about and practice at least 2 grounding/relaxation exercises weekly, practice setting intentional and challenging limits which confront people pleasing/avoidance of conflicts and vulnerability with others, identify at least 3 "parts" of the self and process their origin, function, and unmet emotional needs  REVIEW 8/23/2022: Loida Brito has identified compassion and truthfulness as values which guide her decisions  Her triggers are having a lot to do at once, feeling like she can't say no to others, and her cues for rising stress are feeling anger  She journals regularly and has set time in the past for breathing and introspection  She has been working on setting limits with others, as she has acknowledged that her inner child did not feel secure in her caregivers' capacity to meet her needs otherwise  She should continue to work on this goal as she verbalizes ongoing dissatisfaction in her ability to direct her life authentically  GOAL 1: Modality: Individual 2x per month   Completion Date 1/23/2023 anticipated and The person(s) responsible for carrying out the plan is  the client and this writer       Behavioral Health Treatment Plan [facility]: Diagnosis and Treatment Plan explained to Jacqueline Yancey relates understanding diagnosis and is agreeable to Treatment Plan  Client Comments : Please share your thoughts, feelings, need and/or experiences regarding your treatment plan: None

## 2022-09-06 ENCOUNTER — SOCIAL WORK (OUTPATIENT)
Dept: BEHAVIORAL/MENTAL HEALTH CLINIC | Facility: CLINIC | Age: 27
End: 2022-09-06
Payer: COMMERCIAL

## 2022-09-06 DIAGNOSIS — F43.22 ADJUSTMENT DISORDER WITH ANXIOUS MOOD: Primary | ICD-10-CM

## 2022-09-06 PROCEDURE — 90834 PSYTX W PT 45 MINUTES: CPT | Performed by: SOCIAL WORKER

## 2022-09-06 NOTE — PSYCH
This note was not shared with the patient due to this is a psychotherapy note    Psychotherapy Provided: Individual Psychotherapy 45 minutes     Length of time in session: 45 minutes, follow up in 2 week    Encounter Diagnosis     ICD-10-CM    1  Adjustment disorder with anxious mood  F43 22        Goals addressed in session: Goal 1     Pain:      none    0    Current suicide risk : Low     DATA: Met with Nora for scheduled individual session  She reported feeling well overall stating that she has been busy trying to propel her move to [place]  We discussed disappointments along the way and she was observed with a positive attitude  We discussed current relationship issues and examined areas where she does not feel cared for  We practiced direct ways of getting her needs met, acknowledging that she has changed and her needs are changing  She was receptive to therapeutic feedback  ASSESSMENT: Cherise Caba presents with a normal mood  Her affect is normal range and intensity, appropriate  Cherise Caba exhibits good therapeutic rapport with this clinician  Cherise Caba continues to exhibit willingness to work on treatment goals and objectives  PLAN: Cherise Caba will return in 2 weeks for the next scheduled session  Between sessions, Cherise Caba will practice direct communication strategies and will report back during the next session re: successes and barriers  Behavioral Health Treatment Plan [facility]: Diagnosis and Treatment Plan explained to Nolvia Hollis relates understanding diagnosis and is agreeable to Treatment Plan   Yes

## 2022-10-13 ENCOUNTER — SOCIAL WORK (OUTPATIENT)
Dept: BEHAVIORAL/MENTAL HEALTH CLINIC | Facility: CLINIC | Age: 27
End: 2022-10-13
Payer: COMMERCIAL

## 2022-10-13 DIAGNOSIS — F43.22 ADJUSTMENT DISORDER WITH ANXIOUS MOOD: Primary | ICD-10-CM

## 2022-10-13 PROCEDURE — 90834 PSYTX W PT 45 MINUTES: CPT | Performed by: SOCIAL WORKER

## 2022-10-14 NOTE — PSYCH
This note was not shared with the patient due to this is a psychotherapy note       Visit Time    Visit Start Time: 2:32  Visit Stop Time: 3:17  Total Visit Duration: 45 minutes    Psychotherapy Provided: Individual Psychotherapy 45 minutes     Length of time in session: 45 minutes, follow up in 2 week    Encounter Diagnosis     ICD-10-CM    1  Adjustment disorder with anxious mood  F43 22        Goals addressed in session: Goal 1     Pain:      none    0    Current suicide risk : Low     DATA: Met with Nora for scheduled individual session  She reported feeling well stating that she has been intentional about setting fair limits with her friends who would otherwise expect her to go above and beyond what others would do for them  Client shows evidence of utilizing weighing pros and cons, effective communication skills and distress tolerance skills skills to manage mental health symptoms  During this session, this clinician used the following therapeutic modalities: supportive psychotherapy, mindfulness-based strategies and CBT techniques  Waleska Shah completed the PHQ-9 during the session  This screening is attached to this enounter       ASSESSMENT: Waleska Shah presents with a normal mood  Her affect is normal range and intensity, appropriate  Waleska Shah exhibits good therapeutic rapport with this clinician  Waleska Shah continues to exhibit willingness to work on treatment goals and objectives  PLAN: Waleska Shah will return in 2 weeks for the next scheduled session  Between sessions, Waleska Shah will continue to set boundaries when needed and follow her own intended plan, and will report back during the next session re: successes and barriers  Behavioral Health Treatment Plan [facility]: Diagnosis and Treatment Plan explained to Audrey Zuñiga relates understanding diagnosis and is agreeable to Treatment Plan   Yes

## 2022-11-01 ENCOUNTER — SOCIAL WORK (OUTPATIENT)
Dept: BEHAVIORAL/MENTAL HEALTH CLINIC | Facility: CLINIC | Age: 27
End: 2022-11-01

## 2022-11-01 DIAGNOSIS — F40.10 SOCIAL ANXIETY DISORDER: Primary | ICD-10-CM

## 2022-11-01 NOTE — PSYCH
This note was not shared with the patient due to this is a psychotherapy note    Psychotherapy Provided: Individual Psychotherapy 47 minutes     Length of time in session: 47 minutes, follow up in 2 week    Encounter Diagnosis     ICD-10-CM    1  Social anxiety disorder  F40 10        Goals addressed in session: Goal 1     Pain:      none    0    Current suicide risk : Low     DATA: Met with Nora for scheduled individual session  She reported and discussed feeling ok overall but did share some disconnect in knowing that she is making positive changes for herself but yet does not feel better  Through working through this, we also acknowledged and sat with disappointment in her mother for not being more active and/or interested in Nora's life at all  She became tearful while discussing this which we both recognized was an emotional break through  Writer helped  Shyann Benoit through sitting with this feeling rather than judging it  ASSESSMENT: Shyann Benoit presents with a normal mood  Her affect is normal range and intensity, appropriate  Shyann Benoit exhibits good therapeutic rapport with this clinician  Shyann Benoit continues to exhibit willingness to work on treatment goals and objectives  PLAN: Shyann Benoit will return in 2 weeks for the next scheduled session  Between sessions, Shyann Benoit will practice mindfulness and resist urges to shut down her emotions, and will report back during the next session re: successes and barriers  Behavioral Health Treatment Plan [facility]: Diagnosis and Treatment Plan explained to Araceli Jean relates understanding diagnosis and is agreeable to Treatment Plan   Yes     Visit start and stop times:    11/01/22  Start Time: 0933  Stop Time: 1020  Total Visit Time: 47 minutes

## 2022-11-15 ENCOUNTER — SOCIAL WORK (OUTPATIENT)
Dept: BEHAVIORAL/MENTAL HEALTH CLINIC | Facility: CLINIC | Age: 27
End: 2022-11-15

## 2022-11-15 DIAGNOSIS — F40.10 SOCIAL ANXIETY DISORDER: Primary | ICD-10-CM

## 2022-11-16 NOTE — PSYCH
This note was not shared with the patient due to this is a psychotherapy note    Psychotherapy Provided: Individual Psychotherapy 47 minutes     Length of time in session: 47 minutes, follow up in 2 week    Encounter Diagnosis     ICD-10-CM    1  Social anxiety disorder  F40 10        Goals addressed in session: Goal 1     Pain:      none    0    Current suicide risk : Low     DATA: Met with Nora for scheduled individual session  Continues to make progress with setting reasonable limits with her friends and family  Identified and worked through difficulties "choosing between friends" as it highlights childhood guilt and wrongly placed sense of responsibility over her parents  Identified voices/thoughts of her inner child and angry adolescent, and helped create examples of how/when she can apply those thoughts to help remove current day shame  ASSESSMENT: Marianela Velazco presents with a normal mood  Her affect is normal range and intensity, appropriate  Marianela Velazco exhibits good therapeutic rapport with this clinician  Marianela Velazco continues to exhibit willingness to work on treatment goals and objectives  PLAN: Marianela Velazco will return in 2 weeks for the next scheduled session  Between sessions, Marianela Velazco will practice CBT skills to help correct automatic thoughts related to responsibility over others, and will report back during the next session re: successes and barriers  Behavioral Health Treatment Plan [facility]: Diagnosis and Treatment Plan explained to Sarahmarilia Mak relates understanding diagnosis and is agreeable to Treatment Plan   Yes     Visit start and stop times:    11/15/22

## 2022-11-29 ENCOUNTER — TELEMEDICINE (OUTPATIENT)
Dept: BEHAVIORAL/MENTAL HEALTH CLINIC | Facility: CLINIC | Age: 27
End: 2022-11-29

## 2022-11-29 DIAGNOSIS — F40.10 SOCIAL ANXIETY DISORDER: Primary | ICD-10-CM

## 2022-11-29 NOTE — PSYCH
This note was not shared with the patient due to this is a psychotherapy note    Psychotherapy Provided: Individual Psychotherapy 45 minutes     Length of time in session: 45 minutes, follow up in 2 week    Encounter Diagnosis     ICD-10-CM    1  Social anxiety disorder  F40 10           Goals addressed in session: Goal 1     Pain:      none    0    Current suicide risk : Low     DATA: Met with Nora for scheduled individual session  Had a nice Thanksgiving but did admit to feeling uneasy about taking a step back and not doing as much in terms of preparation  Processed experiences with feeling shameful in response to her friends asking about her progress in moving  Reminded her of the unmet need of being cared for  Discussed strategies to honor her needs, such as needing rest as well  ASSESSMENT: Linda Pita presents with a normal mood  Her affect is normal range and intensity, appropriate  Linda Lema exhibits good therapeutic rapport with this clinician  Linda Lema continues to exhibit willingness to work on treatment goals and objectives  PLAN: Linda Pita will return in 2 weeks for the next scheduled session  Between sessions, Linda Lema will practice positive coping startegies as needed, and will report back during the next session re: successes and barriers  Behavioral Health Treatment Plan [facility]: Diagnosis and Treatment Plan explained to Davida Duverney relates understanding diagnosis and is agreeable to Treatment Plan   Yes     Visit start and stop times:    11/29/22  Start Time: 0935  Stop Time: 1020  Total Visit Time: 45 minutes

## 2022-12-06 ENCOUNTER — TELEPHONE (OUTPATIENT)
Dept: PSYCHIATRY | Facility: CLINIC | Age: 27
End: 2022-12-06

## 2022-12-06 NOTE — TELEPHONE ENCOUNTER
Patient is calling regarding cancelling an appointment      Date/Time: 12/13/2022    Reason: Patient out of town    Patient was rescheduled: YES [] NO []  If yes, when was Patient reschedule for: 12/12/2022 @ 3:30 pm    Patient requesting call back to reschedule: YES [] NO [x]

## 2022-12-23 ENCOUNTER — TELEPHONE (OUTPATIENT)
Dept: PSYCHIATRY | Facility: CLINIC | Age: 27
End: 2022-12-23

## 2022-12-23 NOTE — TELEPHONE ENCOUNTER
Contacted pt to do virtual appt due to Layla's children school is closed. Nora medellin the appt and will see Layla on 12/27/2023

## 2022-12-27 ENCOUNTER — TELEPHONE (OUTPATIENT)
Dept: PSYCHIATRY | Facility: CLINIC | Age: 27
End: 2022-12-27

## 2022-12-27 NOTE — TELEPHONE ENCOUNTER
Patient is calling regarding cancelling an appointment.    Date/Time: 12/27/2022    Reason: sick and does not want virtual    Patient was rescheduled: YES [] NO [x]  If yes, when was Patient reschedule for:     Patient requesting call back to reschedule: YES [] NO [x]

## 2023-01-10 ENCOUNTER — SOCIAL WORK (OUTPATIENT)
Dept: BEHAVIORAL/MENTAL HEALTH CLINIC | Facility: CLINIC | Age: 28
End: 2023-01-10

## 2023-01-10 DIAGNOSIS — F40.10 SOCIAL ANXIETY DISORDER: Primary | ICD-10-CM

## 2023-01-10 NOTE — PSYCH
This note was not shared with the patient due to this is a psychotherapy note    Psychotherapy Provided: Individual Psychotherapy 50 minutes     Length of time in session: 50 minutes, follow up in 2 week    Encounter Diagnosis     ICD-10-CM    1  Social anxiety disorder  F40 10           Goals addressed in session: Goal 1     Pain:      none    0    Current suicide risk : Low     DATA: Met with Nora for scheduled individual session  Discussed feeling relief now that the holidays are over  Identified and worked through upcoming dread; however, due to February being a hard month for her (anniversary of her dad's death and her mother;s diagnosis with cancer)  Acknowledged how this shows up in everyday life, to include: sad mood and low motivation  Reviewed coping strategies such as: leaning into "safe bet" friendships, "just do it" mentality, and movement  Through this, we acknowledged sentiments of anger towards one-sided relationships and writer validated this emotional wound as the source of her relationship issues  Worked on honoring anger as it arises and riding the wave  ASSESSMENT: Ever Newton presents with a normal mood  Her affect is normal range and intensity, appropriate  Ever Newton exhibits good therapeutic rapport with this clinician  Ever Newton continues to exhibit willingness to work on treatment goals and objectives  PLAN: Ever Newton will return in 2 weeks for the next scheduled session  Between sessions, Ever Newton will practice coping strategies as needed, and will report back during the next session re: successes and barriers  Behavioral Health Treatment Plan [facility]: Diagnosis and Treatment Plan explained to Renetta Dias relates understanding diagnosis and is agreeable to Treatment Plan   Yes     Visit start and stop times:    01/10/23  Start Time: 0935  Stop Time: 1025  Total Visit Time: 50 minutes

## 2023-01-24 ENCOUNTER — SOCIAL WORK (OUTPATIENT)
Dept: BEHAVIORAL/MENTAL HEALTH CLINIC | Facility: CLINIC | Age: 28
End: 2023-01-24

## 2023-01-24 DIAGNOSIS — F43.22 ADJUSTMENT DISORDER WITH ANXIOUS MOOD: ICD-10-CM

## 2023-01-24 DIAGNOSIS — F40.10 SOCIAL ANXIETY DISORDER: Primary | ICD-10-CM

## 2023-01-24 NOTE — PSYCH
This note was not shared with the patient due to this is a psychotherapy note    Behavioral Health Psychotherapy Progress Note    Psychotherapy Provided: Individual Psychotherapy     1  Social anxiety disorder        2  Adjustment disorder with anxious mood            Goals addressed in session: Goal 1     DATA: Discussed several updates with regard to moving plans and her upcoming birthday  Identified strong desires to lean into what she wants in the moment and embrace her values, but feeling reactive to her default setting, which is to be cautious and afraid of how others will be affected, what feelings she may feel, and etc  Reviewed coping skills for in the moment distress as well  During this session, this clinician used the following therapeutic modalities: Cognitive Behavioral Therapy, Mindfulness-based Strategies, Motivational Interviewing, Solution-Focused Therapy and Supportive Psychotherapy    Substance Abuse was not addressed during this session  If the client is diagnosed with a co-occurring substance use disorder, please indicate any changes in the frequency or amount of use: N/A  Stage of change for addressing substance use diagnoses: No substance use/Not applicable    ASSESSMENT:  Chase Fang presents with a Euthymic/ normal mood  her affect is Normal range and intensity, which is congruent, with her mood and the content of the session  The client has made progress on their goals  Chase Fang presents with a none risk of suicide, none risk of self-harm, and none risk of harm to others  For any risk assessment that surpasses a "low" rating, a safety plan must be developed  A safety plan was indicated: no  If yes, describe in detail N/A    PLAN: Between sessions, Chase Fang will practice positive coping strategies as needed   At the next session, the therapist will use Cognitive Behavioral Therapy, Mindfulness-based Strategies, Motivational Interviewing, Solution-Focused Therapy and Supportive Psychotherapy to address chronic anxiety and people pleasing  Behavioral Health Treatment Plan and Discharge Planning: Shannon Civrandymarilia is aware of and agrees to continue to work on their treatment plan  They have identified and are working toward their discharge goals   yes    Visit start and stop times:    01/24/23  Start Time: 0930  Stop Time: 1020  Total Visit Time: 50 minutes

## 2023-02-21 ENCOUNTER — SOCIAL WORK (OUTPATIENT)
Dept: BEHAVIORAL/MENTAL HEALTH CLINIC | Facility: CLINIC | Age: 28
End: 2023-02-21

## 2023-02-21 DIAGNOSIS — F40.10 SOCIAL ANXIETY DISORDER: Primary | ICD-10-CM

## 2023-02-21 NOTE — PSYCH
This note was not shared with the patient due to this is a psychotherapy note    Behavioral Health Psychotherapy Progress Note    Psychotherapy Provided: Individual Psychotherapy     1  Social anxiety disorder            Goals addressed in session: Goal 1     DATA: Waleska Shah reported coming off a difficult past couple of weeks because she felt triggered by her Mary Manuel Garcia which then led to feeling distant on an already triggering month (birthday, anniversary of father's death)  She acknowledged connecting with feelings of sadness, loss, and anger and as a result, her anxiety (feeling compelled to perform) lessened  We discussed the value of confronting her whole self and allowing this birthday to be authentic, if nothing else  She discussed feeling proud of herself for being able to tolerate this  We used a feelings wheel to help support her understanding of how she feels in the moment  During this session, this clinician used the following therapeutic modalities: Client-centered Therapy, Cognitive Behavioral Therapy, Cognitive Processing Therapy, Mindfulness-based Strategies and Supportive Psychotherapy    Substance Abuse was not addressed during this session  If the client is diagnosed with a co-occurring substance use disorder, please indicate any changes in the frequency or amount of use: N/A  Stage of change for addressing substance use diagnoses: No substance use/Not applicable    ASSESSMENT:  Mark Tinoco presents with a Euthymic/ normal mood  her affect is Normal range and intensity, which is congruent, with her mood and the content of the session  The client has made progress on their goals  Mark Tinoco presents with a none risk of suicide, none risk of self-harm, and none risk of harm to others  For any risk assessment that surpasses a "low" rating, a safety plan must be developed      A safety plan was indicated: no  If yes, describe in detail N/A    PLAN: Between sessions, Mark Tinoco will identify needs in the moment and resist urges to hide behind people pleasing  At the next session, the therapist will use Client-centered Therapy, Cognitive Behavioral Therapy, Cognitive Processing Therapy, Mindfulness-based Strategies and Supportive Psychotherapy to address self abandonment  Behavioral Health Treatment Plan and Discharge Planning: Nayla Nuñez is aware of and agrees to continue to work on their treatment plan  They have identified and are working toward their discharge goals   yes    Visit start and stop times:    02/21/23  Start Time: 0930  Stop Time: 1020  Total Visit Time: 50 minutes

## 2023-03-07 ENCOUNTER — SOCIAL WORK (OUTPATIENT)
Dept: BEHAVIORAL/MENTAL HEALTH CLINIC | Facility: CLINIC | Age: 28
End: 2023-03-07

## 2023-03-07 DIAGNOSIS — F40.10 SOCIAL ANXIETY DISORDER: Primary | ICD-10-CM

## 2023-03-07 NOTE — PSYCH
This note was not shared with the patient due to this is a psychotherapy note    Behavioral Health Psychotherapy Progress Note    Psychotherapy Provided: Individual Psychotherapy     1  Social anxiety disorder            Goals addressed in session: Goal 1     DATA: Mindy Jim reported feeling well stating that she been busy with family and friend events  She discussed confiding in friends that February was difficult for her but that she was grateful for the authentic emotional experiences  She shared that for now that is all that she is comfortable with sharing with him  She discussed stress at work because she has been receiving comments about her work from home position and is concerned that she may be pressured to return back in the office because it was never officially sanctioned by HR  We processed a range of emotions including anger and not feeling cared for enough to have this approved by HR, as well as, self love and determination to remain steadfast in her position that this is something that they agreed on and what she wants for herself  We discussed the exposure process for accepting and sitting with sadness and anger and how that has removed in some ways, anxiety over what people will think of her  During this session, this clinician used the following therapeutic modalities: Cognitive Behavioral Therapy, Cognitive Processing Therapy, Mindfulness-based Strategies and Supportive Psychotherapy    Substance Abuse was not addressed during this session  If the client is diagnosed with a co-occurring substance use disorder, please indicate any changes in the frequency or amount of use: N/A  Stage of change for addressing substance use diagnoses: No substance use/Not applicable    ASSESSMENT:  Yamileth Damon presents with a Euthymic/ normal mood  her affect is Normal range and intensity, which is congruent, with her mood and the content of the session  The client has made progress on their goals  Charo Mohan presents with a none risk of suicide, none risk of self-harm, and none risk of harm to others  For any risk assessment that surpasses a "low" rating, a safety plan must be developed  A safety plan was indicated: no  If yes, describe in detail N/A    PLAN: Between sessions, Charo Mohan will continue to accept and sit with negative emotions  At the next session, the therapist will use Cognitive Behavioral Therapy, Cognitive Processing Therapy, Mindfulness-based Strategies and Supportive Psychotherapy to address suppression of emotions  Behavioral Health Treatment Plan and Discharge Planning: Charo Mohan is aware of and agrees to continue to work on their treatment plan  They have identified and are working toward their discharge goals   yes    Visit start and stop times:    03/07/23  Start Time: 0930  Stop Time: 1020  Total Visit Time: 50 minutes

## 2023-03-21 ENCOUNTER — SOCIAL WORK (OUTPATIENT)
Dept: BEHAVIORAL/MENTAL HEALTH CLINIC | Facility: CLINIC | Age: 28
End: 2023-03-21

## 2023-03-21 DIAGNOSIS — F40.10 SOCIAL ANXIETY DISORDER: Primary | ICD-10-CM

## 2023-03-21 NOTE — PSYCH
This note was not shared with the patient due to this is a psychotherapy note    Behavioral Health Psychotherapy Progress Note    Psychotherapy Provided: Individual Psychotherapy     1  Social anxiety disorder            Goals addressed in session: Goal 1     DATA: Loc Cheek reported feeling well overall stating that her trip to [place] was uneventful  She was not confronted about her work from home status and received positive remarks regarding her performance overall  Her mom had a medical issue which brought back memories of when she had cancer and Loc Cheek dropped everything to take care of her  We processed these experiences including feelings of resentment and shame  She continues to work on allowing herself time and care  During this session, this clinician used the following therapeutic modalities: Cognitive Behavioral Therapy, Cognitive Processing Therapy, Mindfulness-based Strategies and Supportive Psychotherapy    Substance Abuse was not addressed during this session  If the client is diagnosed with a co-occurring substance use disorder, please indicate any changes in the frequency or amount of use: N/A  Stage of change for addressing substance use diagnoses: No substance use/Not applicable    ASSESSMENT:  Alessandro Freitas presents with a Euthymic/ normal mood  her affect is Normal range and intensity, which is congruent, with her mood and the content of the session  The client has made progress on their goals  Alessandro Freitas presents with a none risk of suicide, none risk of self-harm, and none risk of harm to others  For any risk assessment that surpasses a "low" rating, a safety plan must be developed  A safety plan was indicated: no  If yes, describe in detail N/A    PLAN: Between sessions, Alessandro Freitas will use positive coping strategies and CBT skills   At the next session, the therapist will use Cognitive Behavioral Therapy, Cognitive Processing Therapy, Mindfulness-based Strategies and Supportive Psychotherapy to address co dependency  Behavioral Health Treatment Plan and Discharge Planning: Allyn Oar is aware of and agrees to continue to work on their treatment plan  They have identified and are working toward their discharge goals   yes    Visit start and stop times:    03/21/23  Start Time: 0930  Stop Time: 1020  Total Visit Time: 50 minutes

## 2023-05-02 ENCOUNTER — SOCIAL WORK (OUTPATIENT)
Dept: BEHAVIORAL/MENTAL HEALTH CLINIC | Facility: CLINIC | Age: 28
End: 2023-05-02

## 2023-05-02 DIAGNOSIS — F40.10 SOCIAL ANXIETY DISORDER: Primary | ICD-10-CM

## 2023-05-02 NOTE — PSYCH
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 which she feels will be fun but does admit to reluctancy regarding it ecause she had such a bad experience with her friend Daryle Gelineau wedding  We highlighted the overt differences as well as cognitions to affirm  She continues to discuss wanting to move to [place] but admits that it has been put on the back burner due to getting comfortable  We acknowledged the difficulties she has initiating positive change for herself and how this is a self fulfilling prophecy because she inevitably changes when \"chaos is thrusted upon her\" which is what she avoids  During this session, this clinician used the following therapeutic modalities: Cognitive Behavioral Therapy, Cognitive Processing Therapy, Mindfulness-based Strategies, Motivational Interviewing, Solution-Focused Therapy and Supportive Psychotherapy    Substance Abuse was not addressed during this session  If the client is diagnosed with a co-occurring substance use disorder, please indicate any changes in the frequency or amount of use: N/A  Stage of change for addressing substance use diagnoses: No substance use/Not applicable    ASSESSMENT:  Corine Jamison presents with a Euthymic/ normal mood  her affect is Normal range and intensity, which is congruent, with her mood and the content of the session  The client has made progress on their goals  Corine Jamison presents with a none risk of suicide, none risk of self-harm, and none risk of harm to others  For any risk assessment that surpasses a \"low\" rating, a safety plan must be developed  A safety plan was indicated: no  If yes, describe in detail N/A    PLAN: Between sessions, Corine Jamison will practice coping skills   At the " next session, the therapist will use Cognitive Behavioral Therapy, Cognitive Processing Therapy, Mindfulness-based Strategies, Motivational Interviewing, Solution-Focused Therapy and Supportive Psychotherapy to address stress  Behavioral Health Treatment Plan and Discharge Planning: Lyubov Citizen is aware of and agrees to continue to work on their treatment plan  They have identified and are working toward their discharge goals   yes    Visit start and stop times:    05/02/23  Start Time: 0930  Stop Time: 1020  Total Visit Time: 50 minutes

## 2023-05-16 ENCOUNTER — SOCIAL WORK (OUTPATIENT)
Dept: BEHAVIORAL/MENTAL HEALTH CLINIC | Facility: CLINIC | Age: 28
End: 2023-05-16

## 2023-05-16 DIAGNOSIS — F40.10 SOCIAL ANXIETY DISORDER: Primary | ICD-10-CM

## 2023-05-16 NOTE — PSYCH
"Behavioral Health Psychotherapy Progress Note    Psychotherapy Provided: Individual Psychotherapy     1  Social anxiety disorder            Goals addressed in session: Goal 1     DATA: Nora reported feeling fine  She was unexpectedly laid off a week ago  She has worked on acknowledging negative emotions as they arise, accepting them, but ultimately leaning into a state of gratitude for being able to walk away and be given a hefty severance package  We worked on identifying cues of feeling compelled into old behaviors of needing to fill her time  During this session, this clinician used the following therapeutic modalities: Cognitive Processing Therapy, Mindfulness-based Strategies and Supportive Psychotherapy    Substance Abuse was not addressed during this session  If the client is diagnosed with a co-occurring substance use disorder, please indicate any changes in the frequency or amount of use: N/A  Stage of change for addressing substance use diagnoses: No substance use/Not applicable    ASSESSMENT:  Cachorro Simpson presents with a Euthymic/ normal mood  her affect is Normal range and intensity, which is congruent, with her mood and the content of the session  The client has made progress on their goals  Cachorro Simpson presents with a none risk of suicide, none risk of self-harm, and none risk of harm to others  For any risk assessment that surpasses a \"low\" rating, a safety plan must be developed  A safety plan was indicated: no  If yes, describe in detail N/A    PLAN: Between sessions, Cachorro Simpson will continue to work on CBT and mindfulness skills  At the next session, the therapist will use Cognitive Processing Therapy, Mindfulness-based Strategies and Supportive Psychotherapy to address stress management  Behavioral Health Treatment Plan and Discharge Planning: Cachorro Simpson is aware of and agrees to continue to work on their treatment plan   They have identified and are working " toward their discharge goals   yes    Visit start and stop times:    05/16/23  Start Time: 0930  Stop Time: 1020  Total Visit Time: 50 minutes

## 2023-05-22 ENCOUNTER — OFFICE VISIT (OUTPATIENT)
Dept: FAMILY MEDICINE CLINIC | Facility: CLINIC | Age: 28
End: 2023-05-22

## 2023-05-22 ENCOUNTER — APPOINTMENT (OUTPATIENT)
Dept: LAB | Facility: CLINIC | Age: 28
End: 2023-05-22

## 2023-05-22 VITALS
DIASTOLIC BLOOD PRESSURE: 60 MMHG | BODY MASS INDEX: 30.83 KG/M2 | OXYGEN SATURATION: 99 % | SYSTOLIC BLOOD PRESSURE: 100 MMHG | HEART RATE: 72 BPM | WEIGHT: 174 LBS | HEIGHT: 63 IN | TEMPERATURE: 97.3 F | RESPIRATION RATE: 16 BRPM

## 2023-05-22 DIAGNOSIS — Z00.00 WELL ADULT EXAM: Primary | ICD-10-CM

## 2023-05-22 DIAGNOSIS — Z13.6 SCREENING FOR CARDIOVASCULAR CONDITION: ICD-10-CM

## 2023-05-22 DIAGNOSIS — Z11.59 ENCOUNTER FOR HEPATITIS C SCREENING TEST FOR LOW RISK PATIENT: ICD-10-CM

## 2023-05-22 DIAGNOSIS — R53.83 OTHER FATIGUE: ICD-10-CM

## 2023-05-22 LAB
ALBUMIN SERPL BCP-MCNC: 4 G/DL (ref 3.5–5)
ALP SERPL-CCNC: 51 U/L (ref 46–116)
ALT SERPL W P-5'-P-CCNC: 19 U/L (ref 12–78)
ANION GAP SERPL CALCULATED.3IONS-SCNC: 1 MMOL/L (ref 4–13)
AST SERPL W P-5'-P-CCNC: 16 U/L (ref 5–45)
BASOPHILS # BLD AUTO: 0.06 THOUSANDS/ÂΜL (ref 0–0.1)
BASOPHILS NFR BLD AUTO: 1 % (ref 0–1)
BILIRUB SERPL-MCNC: 0.44 MG/DL (ref 0.2–1)
BUN SERPL-MCNC: 15 MG/DL (ref 5–25)
CALCIUM SERPL-MCNC: 9.3 MG/DL (ref 8.3–10.1)
CHLORIDE SERPL-SCNC: 109 MMOL/L (ref 96–108)
CHOLEST SERPL-MCNC: 213 MG/DL
CO2 SERPL-SCNC: 25 MMOL/L (ref 21–32)
CREAT SERPL-MCNC: 0.94 MG/DL (ref 0.6–1.3)
EOSINOPHIL # BLD AUTO: 0.1 THOUSAND/ÂΜL (ref 0–0.61)
EOSINOPHIL NFR BLD AUTO: 2 % (ref 0–6)
ERYTHROCYTE [DISTWIDTH] IN BLOOD BY AUTOMATED COUNT: 11.9 % (ref 11.6–15.1)
GFR SERPL CREATININE-BSD FRML MDRD: 82 ML/MIN/1.73SQ M
GLUCOSE P FAST SERPL-MCNC: 87 MG/DL (ref 65–99)
HCT VFR BLD AUTO: 41.5 % (ref 34.8–46.1)
HDLC SERPL-MCNC: 56 MG/DL
HGB BLD-MCNC: 13.9 G/DL (ref 11.5–15.4)
IMM GRANULOCYTES # BLD AUTO: 0.02 THOUSAND/UL (ref 0–0.2)
IMM GRANULOCYTES NFR BLD AUTO: 0 % (ref 0–2)
LDLC SERPL CALC-MCNC: 124 MG/DL (ref 0–100)
LYMPHOCYTES # BLD AUTO: 2.2 THOUSANDS/ÂΜL (ref 0.6–4.47)
LYMPHOCYTES NFR BLD AUTO: 32 % (ref 14–44)
MCH RBC QN AUTO: 30.5 PG (ref 26.8–34.3)
MCHC RBC AUTO-ENTMCNC: 33.5 G/DL (ref 31.4–37.4)
MCV RBC AUTO: 91 FL (ref 82–98)
MONOCYTES # BLD AUTO: 0.4 THOUSAND/ÂΜL (ref 0.17–1.22)
MONOCYTES NFR BLD AUTO: 6 % (ref 4–12)
NEUTROPHILS # BLD AUTO: 4.02 THOUSANDS/ÂΜL (ref 1.85–7.62)
NEUTS SEG NFR BLD AUTO: 59 % (ref 43–75)
NONHDLC SERPL-MCNC: 157 MG/DL
NRBC BLD AUTO-RTO: 0 /100 WBCS
PLATELET # BLD AUTO: 245 THOUSANDS/UL (ref 149–390)
PMV BLD AUTO: 10.2 FL (ref 8.9–12.7)
POTASSIUM SERPL-SCNC: 4.2 MMOL/L (ref 3.5–5.3)
PROT SERPL-MCNC: 7.5 G/DL (ref 6.4–8.4)
RBC # BLD AUTO: 4.55 MILLION/UL (ref 3.81–5.12)
SODIUM SERPL-SCNC: 135 MMOL/L (ref 135–147)
TRIGL SERPL-MCNC: 164 MG/DL
TSH SERPL DL<=0.05 MIU/L-ACNC: 1.45 UIU/ML (ref 0.45–4.5)
WBC # BLD AUTO: 6.8 THOUSAND/UL (ref 4.31–10.16)

## 2023-05-22 RX ORDER — MAGNESIUM 200 MG
TABLET ORAL
COMMUNITY

## 2023-05-22 NOTE — PROGRESS NOTES
FAMILY PRACTICE HEALTH MAINTENANCE OFFICE VISIT  [facility]    NAME: Manuel Moralez  AGE: 33 y o  SEX: female  : 1995     DATE: 2023    Assessment and Plan     1  Well adult exam    2  Other fatigue  -     TSH, 3rd generation with Free T4 reflex; Future    3  Screening for cardiovascular condition  -     CBC and differential; Future  -     Comprehensive metabolic panel; Future  -     Lipid panel; Future    4  Encounter for hepatitis C screening test for low risk patient  -     Hepatitis C antibody; Future      Patient Counseling:   Nutrition: Stressed importance of a well balanced diet, moderation of sodium/saturated fat, caloric balance and sufficient intake of fiber  Exercise: Stressed the importance of regular exercise with a goal of 150 minutes per week  Dental Health: Discussed daily flossing and brushing and regular dental visits     Immunizations reviewed: Up To Date  Discussed benefits of:  Cervical cancer screening  BMI Counseling: Body mass index is 30 82 kg/m²  Discussed with patient's BMI with her  The BMI is above normal  Nutrition recommendations include reducing portion sizes and decreasing overall calorie intake  Exercise recommendations include moderate aerobic physical activity for 150 minutes/week  Return in about 1 year (around 2024) for Annual physical         Chief Complaint     Chief Complaint   Patient presents with   • Physical Exam     Harlan Harley MA         History of Present Illness     Here for CPE  Feels well          Well Adult Physical   Patient here for a comprehensive physical exam       Diet and Physical Activity  Diet: well balanced diet  Exercise: frequently      Depression Screen  PHQ-2/9 Depression Screening    Little interest or pleasure in doing things: 0 - not at all  Feeling down, depressed, or hopeless: 1 - several days  PHQ-2 Score: 1  PHQ-2 Interpretation: Negative depression screen          General Health  Hearing: Normal:  bilateral  Vision: no vision problems  Dental: regular dental visits    Reproductive Health  No issues  and Follows with gynecologist      The following portions of the patient's history were reviewed and updated as appropriate: allergies, current medications, past family history, past medical history, past social history, past surgical history and problem list     Review of Systems     Review of Systems   Constitutional: Negative  HENT: Negative  Eyes: Negative  Respiratory: Negative  Cardiovascular: Negative  Gastrointestinal: Negative  Endocrine: Negative  Genitourinary: Negative  Musculoskeletal: Negative  Skin: Negative  Allergic/Immunologic: Negative  Neurological: Negative  Hematological: Negative  Psychiatric/Behavioral: Negative  Past Medical History     History reviewed  No pertinent past medical history  Past Surgical History     History reviewed  No pertinent surgical history  Social History     Social History     Socioeconomic History   • Marital status: Single     Spouse name: None   • Number of children: None   • Years of education: None   • Highest education level: None   Occupational History   • None   Tobacco Use   • Smoking status: Never   • Smokeless tobacco: Never   Vaping Use   • Vaping Use: Never used   Substance and Sexual Activity   • Alcohol use:  Yes     Alcohol/week: 2 0 standard drinks     Types: 2 Glasses of wine per week     Comment: 1-2 per week moderate   • Drug use: Never   • Sexual activity: Yes     Partners: Male     Birth control/protection: Condom Male   Other Topics Concern   • None   Social History Narrative    ** Merged History Encounter **          Social Determinants of Health     Financial Resource Strain: Not on file   Food Insecurity: Not on file   Transportation Needs: Not on file   Physical Activity: Not on file   Stress: Not on file   Social Connections: Not on file   Intimate Partner "Violence: Not on file   Housing Stability: Not on file       Family History     Family History   Problem Relation Age of Onset   • Throat cancer Mother    • Heart attack Father    • Colon cancer Maternal Grandfather    • Colon cancer Maternal Aunt        Current Medications       Current Outpatient Medications:   •  Cholecalciferol (D3 2000 PO), Take by mouth, Disp: , Rfl:   •  Magnesium 200 MG TABS, Take by mouth, Disp: , Rfl:   •  spironolactone (ALDACTONE) 100 mg tablet, Take 1 tablet (100 mg total) by mouth daily, Disp: 90 tablet, Rfl: 4  •  tretinoin (RETIN-A) 0 025 % cream, Apply topically daily at bedtime Spread one pea-sized amount of medication over entire face about one hour before bedtime  , Disp: 45 g, Rfl: 2     Allergies     No Known Allergies    Objective     /60   Pulse 72   Temp (!) 97 3 °F (36 3 °C)   Resp 16   Ht 5' 3\" (1 6 m)   Wt 78 9 kg (174 lb)   LMP 05/19/2023 (Exact Date)   SpO2 99%   BMI 30 82 kg/m²      Physical Exam  Constitutional:       General: She is not in acute distress  Appearance: She is well-developed  She is not diaphoretic  HENT:      Head: Normocephalic and atraumatic  Right Ear: External ear normal       Left Ear: External ear normal    Neck:      Thyroid: No thyromegaly  Cardiovascular:      Rate and Rhythm: Normal rate and regular rhythm  Heart sounds: Normal heart sounds  No murmur heard  No friction rub  No gallop  Pulmonary:      Effort: Pulmonary effort is normal       Breath sounds: Normal breath sounds  No wheezing or rales  Abdominal:      General: Bowel sounds are normal       Palpations: Abdomen is soft  There is no mass  Tenderness: There is no abdominal tenderness  There is no rebound  Musculoskeletal:         General: No deformity  Normal range of motion  Cervical back: Normal range of motion and neck supple  Lymphadenopathy:      Cervical: No cervical adenopathy  Skin:     General: Skin is warm and dry        " Findings: No rash  Neurological:      Mental Status: She is alert and oriented to person, place, and time  Cranial Nerves: No cranial nerve deficit  Motor: No abnormal muscle tone  Coordination: Coordination normal       Deep Tendon Reflexes: Reflexes are normal and symmetric  Reflexes normal    Psychiatric:         Behavior: Behavior normal          Thought Content:  Thought content normal          Judgment: Judgment normal            Vision Screening    Right eye Left eye Both eyes   Without correction      With correction 20/20 20/20 20/15           Byron Coto, [facility] Torri Merino

## 2023-05-24 LAB — HCV AB SER QL: NORMAL

## 2023-05-30 ENCOUNTER — SOCIAL WORK (OUTPATIENT)
Dept: BEHAVIORAL/MENTAL HEALTH CLINIC | Facility: CLINIC | Age: 28
End: 2023-05-30

## 2023-05-30 DIAGNOSIS — F40.10 SOCIAL ANXIETY DISORDER: Primary | ICD-10-CM

## 2023-05-30 NOTE — PSYCH
"Behavioral Health Psychotherapy Progress Note    Psychotherapy Provided: Individual Psychotherapy     1  Social anxiety disorder            Goals addressed in session: Goal 1     DATA: Nereida Maynard reports feeling well overall stating that she has been exercising daily and spending time with her friend Stephanie Pompa  She has however also had some difficulties sleeping and has noticed isolative tendencies  We acknowledged and processed feeling pressured to do \"something great\" and in many ways recognizing increased resentment towards that  Writer taught her how to connect with herself in the moment and to calm her alarm system through breathwork, body awareness, and affirmation of accepting herself as she is in this moment  During this session, this clinician used the following therapeutic modalities: Cognitive Behavioral Therapy, Cognitive Processing Therapy, Mindfulness-based Strategies, Solution-Focused Therapy, Supportive Psychotherapy and somatic exercises  Substance Abuse was not addressed during this session  If the client is diagnosed with a co-occurring substance use disorder, please indicate any changes in the frequency or amount of use: N/A  Stage of change for addressing substance use diagnoses: No substance use/Not applicable    ASSESSMENT:  Flora Whitlock presents with a Euthymic/ normal mood  her affect is Normal range and intensity, which is congruent, with her mood and the content of the session  The client has made progress on their goals  Flora Whitlock presents with a none risk of suicide, none risk of self-harm, and none risk of harm to others  For any risk assessment that surpasses a \"low\" rating, a safety plan must be developed  A safety plan was indicated: no  If yes, describe in detail N/A    PLAN: Between sessions, Flora Whitlock will practice somatic exercises and talk to a friend about what she is experiencing   At the next session, the therapist will use Cognitive Behavioral " Therapy, Cognitive Processing Therapy, Mindfulness-based Strategies, Solution-Focused Therapy, Supportive Psychotherapy and somatic exercises  to address adjustment of losing her job  Behavioral Health Treatment Plan and Discharge Planning: Caleb Krueger is aware of and agrees to continue to work on their treatment plan  They have identified and are working toward their discharge goals   yes    Visit start and stop times:    05/30/23  Start Time: 0930  Stop Time: 1020  Total Visit Time: 50 minutes

## 2023-06-13 ENCOUNTER — SOCIAL WORK (OUTPATIENT)
Dept: BEHAVIORAL/MENTAL HEALTH CLINIC | Facility: CLINIC | Age: 28
End: 2023-06-13
Payer: COMMERCIAL

## 2023-06-13 DIAGNOSIS — F40.10 SOCIAL ANXIETY DISORDER: Primary | ICD-10-CM

## 2023-06-13 PROCEDURE — 90834 PSYTX W PT 45 MINUTES: CPT | Performed by: SOCIAL WORKER

## 2023-06-13 NOTE — PSYCH
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, and family visiting from out of town that are currently coping with a family member who is struggling with alcoholism  We acknowledged expectations to take care of those around her and writer helped her unravel and understand the role of her anxiety within these contexts  We practiced calming techniques to speak to her inner child and cognitive restructuring  During this session, this clinician used the following therapeutic modalities: Cognitive Behavioral Therapy, Cognitive Processing Therapy, Dialectical Behavior Therapy, Mindfulness-based Strategies, Supportive Psychotherapy and somatic exercises  Substance Abuse was not addressed during this session  If the client is diagnosed with a co-occurring substance use disorder, please indicate any changes in the frequency or amount of use: N/A  Stage of change for addressing substance use diagnoses: No substance use/Not applicable    ASSESSMENT:  Wolfgang Cardenas presents with a Euthymic/ normal mood  her affect is Normal range and intensity, which is congruent, with her mood and the content of the session  The client has made progress on their goals  Wolfgang Cardenas presents with a none risk of suicide, none risk of self-harm, and none risk of harm to others  For any risk assessment that surpasses a \"low\" rating, a safety plan must be developed      A safety plan was indicated: no  If yes, describe in detail N/A    PLAN: Between sessions, Wolfgang Cardenas will practice calming techniques and CBT " skills  At the next session, the therapist will use Cognitive Behavioral Therapy, Cognitive Processing Therapy, Dialectical Behavior Therapy, Mindfulness-based Strategies, Supportive Psychotherapy and somatic exercises  to address stress  Behavioral Health Treatment Plan and Discharge Planning: Erwin Alejandreh is aware of and agrees to continue to work on their treatment plan  They have identified and are working toward their discharge goals   yes    Visit start and stop times:    06/13/23  Start Time: 0930  Stop Time: 1020  Total Visit Time: 50 minutes

## 2023-06-27 ENCOUNTER — TELEPHONE (OUTPATIENT)
Dept: PSYCHIATRY | Facility: CLINIC | Age: 28
End: 2023-06-27
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
## 2023-07-05 ENCOUNTER — TELEPHONE (OUTPATIENT)
Dept: DERMATOLOGY | Facility: CLINIC | Age: 28
End: 2023-07-05

## 2023-07-25 ENCOUNTER — SOCIAL WORK (OUTPATIENT)
Dept: BEHAVIORAL/MENTAL HEALTH CLINIC | Facility: CLINIC | Age: 28
End: 2023-07-25
Payer: COMMERCIAL

## 2023-07-25 DIAGNOSIS — F40.10 SOCIAL ANXIETY DISORDER: Primary | ICD-10-CM

## 2023-07-25 PROCEDURE — 90834 PSYTX W PT 45 MINUTES: CPT | Performed by: SOCIAL WORKER

## 2023-07-25 NOTE — PSYCH
Behavioral Health Psychotherapy Progress Note    Psychotherapy Provided: Individual Psychotherapy     1. Social anxiety disorder            Goals addressed in session: Goal 1     DATA: Emily Acharya reports feeling well. She has been vacationing and traveling, which has been both refreshing and fun. She set boundaries with her friends and is allowing herself to sit with anger and annoyance as it arises. We acknowledged her wanting to have a youthful summer wherein she does not have pressure and writer agreed that this is necessary. We talked about keeping structure and routine somewhat to her schedule and agreed to make at least 1 change daily. During this session, this clinician used the following therapeutic modalities: Cognitive Behavioral Therapy, Cognitive Processing Therapy, Mindfulness-based Strategies and Supportive Psychotherapy    Substance Abuse was not addressed during this session. If the client is diagnosed with a co-occurring substance use disorder, please indicate any changes in the frequency or amount of use: N/A. Stage of change for addressing substance use diagnoses: No substance use/Not applicable    ASSESSMENT:  Radha Sylvester presents with a Euthymic/ normal mood. her affect is Normal range and intensity, which is congruent, with her mood and the content of the session. The client has made progress on their goals. Radha Sylvester presents with a none risk of suicide, none risk of self-harm, and none risk of harm to others. For any risk assessment that surpasses a "low" rating, a safety plan must be developed. A safety plan was indicated: no  If yes, describe in detail N/A    PLAN: Between sessions, Radha Sylvester will practice self care daily. At the next session, the therapist will use Cognitive Behavioral Therapy, Cognitive Processing Therapy, Mindfulness-based Strategies and Supportive Psychotherapy to address stress.     Behavioral Health Treatment Plan and Discharge Planning: Naya Lisa is aware of and agrees to continue to work on their treatment plan. They have identified and are working toward their discharge goals.  yes    Visit start and stop times:    07/25/23  Start Time: 0930  Stop Time: 1020  Total Visit Time: 50 minutes

## 2023-08-08 ENCOUNTER — SOCIAL WORK (OUTPATIENT)
Dept: BEHAVIORAL/MENTAL HEALTH CLINIC | Facility: CLINIC | Age: 28
End: 2023-08-08
Payer: COMMERCIAL

## 2023-08-08 DIAGNOSIS — F40.10 SOCIAL ANXIETY DISORDER: Primary | ICD-10-CM

## 2023-08-08 PROCEDURE — 90834 PSYTX W PT 45 MINUTES: CPT | Performed by: SOCIAL WORKER

## 2023-08-08 NOTE — PSYCH
Behavioral Health Psychotherapy Progress Note    Psychotherapy Provided: Individual Psychotherapy     1. Social anxiety disorder            Goals addressed in session: Goal 1     DATA: Prosper Borges reports feeling well stating that she is just coming off of a string of traveling which she enjoyed but is now looking forward to spending some time at home. We discussed a new topic: body image, and really got through to the core of her concerns which was: feeling unhappy in her body, not feeling like she fits in with either her "big" friends or her "small" friends, and feeling afraid of black and white thinking. We uncovered the gray areas and addressed her relationship with food. During this session, this clinician used the following therapeutic modalities: Cognitive Behavioral Therapy, Cognitive Processing Therapy, Mindfulness-based Strategies and Supportive Psychotherapy    Substance Abuse was not addressed during this session. If the client is diagnosed with a co-occurring substance use disorder, please indicate any changes in the frequency or amount of use: N/A. Stage of change for addressing substance use diagnoses: No substance use/Not applicable    ASSESSMENT:  Kait Weinstein presents with a Euthymic/ normal mood. her affect is Normal range and intensity, which is congruent, with her mood and the content of the session. The client has made progress on their goals. Kait Weinstein presents with a none risk of suicide, none risk of self-harm, and none risk of harm to others. For any risk assessment that surpasses a "low" rating, a safety plan must be developed. A safety plan was indicated: no  If yes, describe in detail N/A    PLAN: Between sessions, Kait Weinstein will practice self care strategies as needed.  At the next session, the therapist will use Cognitive Behavioral Therapy, Cognitive Processing Therapy, Mindfulness-based Strategies and Supportive Psychotherapy to address stress management. Behavioral Health Treatment Plan and Discharge Planning: Enrrique Serrano is aware of and agrees to continue to work on their treatment plan. They have identified and are working toward their discharge goals.  yes    Visit start and stop times:    08/08/23  Start Time: 0930  Stop Time: 1020  Total Visit Time: 50 minutes

## 2023-08-22 ENCOUNTER — SOCIAL WORK (OUTPATIENT)
Dept: BEHAVIORAL/MENTAL HEALTH CLINIC | Facility: CLINIC | Age: 28
End: 2023-08-22

## 2023-08-22 DIAGNOSIS — F40.10 SOCIAL ANXIETY DISORDER: Primary | ICD-10-CM

## 2023-08-22 PROCEDURE — 90834 PSYTX W PT 45 MINUTES: CPT | Performed by: SOCIAL WORKER

## 2023-08-22 NOTE — PSYCH
Behavioral Health Psychotherapy Progress Note    Psychotherapy Provided: Individual Psychotherapy     1. Social anxiety disorder            Goals addressed in session: Goal 1     DATA: Abbey Canales presented to this appointment tearful and sad stating that her mom is in the hospital for an emergent hernia repair which has brought back a flood of repressed anger, sadness towards herself, and dread from having taken on a caregiver role well before she was ready. We acknowledged and sat with her various emotions, working to compassionately soothe herself. She was able to come up with a list of boundaries for herself and ways to take care of herself during this stressful time. During this session, this clinician used the following therapeutic modalities: Cognitive Behavioral Therapy, Cognitive Processing Therapy, Mindfulness-based Strategies and Supportive Psychotherapy    Substance Abuse was not addressed during this session. If the client is diagnosed with a co-occurring substance use disorder, please indicate any changes in the frequency or amount of use: N/A. Stage of change for addressing substance use diagnoses: No substance use/Not applicable    ASSESSMENT:  King Papa presents with a Euthymic/ normal mood. her affect is Normal range and intensity, which is congruent, with her mood and the content of the session. The client has made progress on their goals. King Papa presents with a none risk of suicide, none risk of self-harm, and none risk of harm to others. For any risk assessment that surpasses a "low" rating, a safety plan must be developed. A safety plan was indicated: no  If yes, describe in detail N/A    PLAN: Between sessions, King Papa will practice self care skills. At the next session, the therapist will use Cognitive Behavioral Therapy, Cognitive Processing Therapy, Mindfulness-based Strategies and Supportive Psychotherapy to address stress management.     Behavioral Health Treatment Plan and Discharge Planning: Pia Gallo is aware of and agrees to continue to work on their treatment plan. They have identified and are working toward their discharge goals.  yes    Visit start and stop times:    08/22/23  Start Time: 0930  Stop Time: 1020  Total Visit Time: 50 minutes

## 2023-08-30 ENCOUNTER — TELEPHONE (OUTPATIENT)
Dept: PSYCHIATRY | Facility: CLINIC | Age: 28
End: 2023-08-30

## 2023-09-08 ENCOUNTER — SOCIAL WORK (OUTPATIENT)
Dept: BEHAVIORAL/MENTAL HEALTH CLINIC | Facility: CLINIC | Age: 28
End: 2023-09-08

## 2023-09-08 DIAGNOSIS — F40.10 SOCIAL ANXIETY DISORDER: Primary | ICD-10-CM

## 2023-09-08 PROCEDURE — 90834 PSYTX W PT 45 MINUTES: CPT | Performed by: SOCIAL WORKER

## 2023-09-08 NOTE — PSYCH
Behavioral Health Psychotherapy Progress Note    Psychotherapy Provided: Individual Psychotherapy     1. Social anxiety disorder            Goals addressed in session: Goal 1     DATA: Jolene Vega reports feeling well overall stating that her mom had a successful hernia repair surgery and that she has been maintaining healthy boundaries for herself. She discussed a situation with her friends that is hurtful and together we processed her anger and sense of loss. We worked on coming up with strategies to help work through the anger such as journaling, as well as, maintaining her boundary as self love. During this session, this clinician used the following therapeutic modalities: Cognitive Processing Therapy, Mindfulness-based Strategies and Supportive Psychotherapy    Substance Abuse was not addressed during this session. If the client is diagnosed with a co-occurring substance use disorder, please indicate any changes in the frequency or amount of use: N/A. Stage of change for addressing substance use diagnoses: No substance use/Not applicable    ASSESSMENT:  Jian Trujillo presents with a Euthymic/ normal mood. her affect is Normal range and intensity, which is congruent, with her mood and the content of the session. The client has made progress on their goals. Jian Trujillo presents with a none risk of suicide, none risk of self-harm, and none risk of harm to others. For any risk assessment that surpasses a "low" rating, a safety plan must be developed. A safety plan was indicated: no  If yes, describe in detail N/A    PLAN: Between sessions, Jian Trujillo will practice self care skills as needed. At the next session, the therapist will use Cognitive Behavioral Therapy, Cognitive Processing Therapy, Mindfulness-based Strategies and Supportive Psychotherapy to address connecting with anger.     Behavioral Health Treatment Plan and Discharge Planning: Jian Trujillo is aware of and agrees to continue to work on their treatment plan. They have identified and are working toward their discharge goals.  yes    Visit start and stop times:    09/08/23  Start Time: 0830  Stop Time: 0920  Total Visit Time: 50 minutes

## 2023-09-19 ENCOUNTER — SOCIAL WORK (OUTPATIENT)
Dept: BEHAVIORAL/MENTAL HEALTH CLINIC | Facility: CLINIC | Age: 28
End: 2023-09-19

## 2023-09-19 DIAGNOSIS — F40.10 SOCIAL ANXIETY DISORDER: Primary | ICD-10-CM

## 2023-09-19 PROCEDURE — 90834 PSYTX W PT 45 MINUTES: CPT | Performed by: SOCIAL WORKER

## 2023-09-19 NOTE — PSYCH
Behavioral Health Psychotherapy Progress Note    Psychotherapy Provided: Individual Psychotherapy     1. Social anxiety disorder            Goals addressed in session: Goal 1     DATA: Alexis Payne reported feeling well stating that her mom has been discharged and is 75% recovered from her hernia repair surgery. She discussed feeling emotionally drained but that is mostly due to her friend group having troubles. We identified triggers as well as her continued commitment to allowing herself to be angry rather than adjusting herself to the comforts of other people. We talked about techniques to help with this including butterfly hug and affirmations of self. During this session, this clinician used the following therapeutic modalities: Cognitive Processing Therapy, Mindfulness-based Strategies and Supportive Psychotherapy    Substance Abuse was not addressed during this session. If the client is diagnosed with a co-occurring substance use disorder, please indicate any changes in the frequency or amount of use: N/A. Stage of change for addressing substance use diagnoses: No substance use/Not applicable    ASSESSMENT:  Gaston Lomax presents with a Euthymic/ normal mood. her affect is Normal range and intensity, which is congruent, with her mood and the content of the session. The client has made progress on their goals. Gaston Lomax presents with a none risk of suicide, none risk of self-harm, and none risk of harm to others. For any risk assessment that surpasses a "low" rating, a safety plan must be developed. A safety plan was indicated: no  If yes, describe in detail N/A    PLAN: Between sessions, Gaston Lomax will practice self love techniques. At the next session, the therapist will use Cognitive Behavioral Therapy, Cognitive Processing Therapy, Mindfulness-based Strategies and Supportive Psychotherapy to address relationship stress.     Behavioral Health Treatment Plan and Discharge Planning: Marjorie Orona is aware of and agrees to continue to work on their treatment plan. They have identified and are working toward their discharge goals.  yes    Visit start and stop times:    09/19/23  Start Time: 0930  Stop Time: 1020  Total Visit Time: 50 minutes

## 2023-10-03 ENCOUNTER — SOCIAL WORK (OUTPATIENT)
Dept: BEHAVIORAL/MENTAL HEALTH CLINIC | Facility: CLINIC | Age: 28
End: 2023-10-03
Payer: COMMERCIAL

## 2023-10-03 DIAGNOSIS — F40.10 SOCIAL ANXIETY DISORDER: Primary | ICD-10-CM

## 2023-10-03 PROCEDURE — 90834 PSYTX W PT 45 MINUTES: CPT | Performed by: SOCIAL WORKER

## 2023-10-03 NOTE — PSYCH
Behavioral Health Psychotherapy Progress Note    Psychotherapy Provided: Individual Psychotherapy     1. Social anxiety disorder            Goals addressed in session: Goal 1     DATA: Julian Roberto reported feeling ok stating that she is aware that she is procrastinating finding a new job. We processed what her fear in doing so is linked to: fear of being inadequate, starting over and possibly failing, as well as, gathered the evidence which supports otherwise. We examined the relationship between productivity and a sense of worth, as well as, how she struggles to provide herself with care and comfort. We did discuss making a referral for psychiatry as writer did recommend possibly looking into an antidepressant to help with low motivation. She was receptive to this. During this session, this clinician used the following therapeutic modalities: Cognitive Behavioral Therapy, Cognitive Processing Therapy, Mindfulness-based Strategies and Supportive Psychotherapy    Substance Abuse was not addressed during this session. If the client is diagnosed with a co-occurring substance use disorder, please indicate any changes in the frequency or amount of use: N/A. Stage of change for addressing substance use diagnoses: No substance use/Not applicable    ASSESSMENT:  La Nena Cosme presents with a Euthymic/ normal mood. her affect is Normal range and intensity, which is congruent, with her mood and the content of the session. The client has made progress on their goals. La Nena Cosme presents with a none risk of suicide, none risk of self-harm, and none risk of harm to others. For any risk assessment that surpasses a "low" rating, a safety plan must be developed. A safety plan was indicated: no  If yes, describe in detail N/A    PLAN: Between sessions, La Nena Cosme will be intentional of the care that she provides to herself and writer will make her a referral for a psychiatric evaluation.  At the next session, the therapist will use Cognitive Behavioral Therapy, Cognitive Processing Therapy, Mindfulness-based Strategies and Supportive Psychotherapy to address stress management. Behavioral Health Treatment Plan and Discharge Planning: Dayton Tatum is aware of and agrees to continue to work on their treatment plan. They have identified and are working toward their discharge goals.  yes    Visit start and stop times:    10/03/23  Start Time: 0930  Stop Time: 1020  Total Visit Time: 50 minutes

## 2023-10-13 ENCOUNTER — TELEPHONE (OUTPATIENT)
Dept: PSYCHIATRY | Facility: CLINIC | Age: 28
End: 2023-10-13

## 2023-10-13 NOTE — TELEPHONE ENCOUNTER
Patient is calling regarding cancelling an appointment. Date/Time: 10/17/2023     Reason: Patient is out of town. Doesn't want virtual or to reschedule.     Patient was rescheduled: YES [] NO [x]  If yes, when was Patient reschedule for:     Patient requesting call back to reschedule: YES [] NO [x]

## 2023-10-31 ENCOUNTER — SOCIAL WORK (OUTPATIENT)
Dept: BEHAVIORAL/MENTAL HEALTH CLINIC | Facility: CLINIC | Age: 28
End: 2023-10-31

## 2023-10-31 DIAGNOSIS — F43.22 ADJUSTMENT DISORDER WITH ANXIOUS MOOD: ICD-10-CM

## 2023-10-31 DIAGNOSIS — F40.10 SOCIAL ANXIETY DISORDER: Primary | ICD-10-CM

## 2023-10-31 PROCEDURE — 90834 PSYTX W PT 45 MINUTES: CPT | Performed by: SOCIAL WORKER
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
## 2023-11-14 ENCOUNTER — TELEPHONE (OUTPATIENT)
Dept: PSYCHIATRY | Facility: CLINIC | Age: 28
End: 2023-11-14

## 2023-11-14 ENCOUNTER — SOCIAL WORK (OUTPATIENT)
Dept: BEHAVIORAL/MENTAL HEALTH CLINIC | Facility: CLINIC | Age: 28
End: 2023-11-14

## 2023-11-14 DIAGNOSIS — F43.22 ADJUSTMENT DISORDER WITH ANXIOUS MOOD: Primary | ICD-10-CM

## 2023-11-14 PROCEDURE — 90834 PSYTX W PT 45 MINUTES: CPT | Performed by: SOCIAL WORKER

## 2023-11-15 NOTE — PSYCH
Behavioral Health Psychotherapy Progress Note    Psychotherapy Provided: Individual Psychotherapy     1. Adjustment disorder with anxious mood            Goals addressed in session: Goal 1     DATA: Uriel Rm reports feeling well stating that she has been mindful of her people pleasing tendencies when it comes to her friends and has been working on setting reasonable limits for herself. 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 She has not emersed herself yet into job searching but does feel that she will soon. We reviewed symptom management skills and how she is using daily exercise, light exposure, and cognitive restructuring of black and white thinking patterns to help limit seasonal depression. During this session, this clinician used the following therapeutic modalities: Cognitive Behavioral Therapy, Cognitive Processing Therapy, and Supportive Psychotherapy    Substance Abuse was not addressed during this session. If the client is diagnosed with a co-occurring substance use disorder, please indicate any changes in the frequency or amount of use: N/A. Stage of change for addressing substance use diagnoses: No substance use/Not applicable    ASSESSMENT:  Sultana Power presents with a Euthymic/ normal mood. her affect is Normal range and intensity, which is congruent, with her mood and the content of the session. The client has made progress on their goals. During this session the client was oriented to person, place, and time. The client was engaged in the session. The client was able to sustain direct eye contact and was without psychomotor agitation. The client's thought processes were linear and clear. Sultana Power presents with a none risk of suicide, none risk of self-harm, and none risk of harm to others.     For any risk assessment that surpasses a "low" rating, a safety plan must be developed. A safety plan was indicated: no  If yes, describe in detail N/A    PLAN: Between sessions, Pia Gallo will practice self care and CBT skills. At the next session, the therapist will use Cognitive Behavioral Therapy, Cognitive Processing Therapy, and Supportive Psychotherapy to address stressors as needed. Behavioral Health Treatment Plan and Discharge Planning: Pia Gallo is aware of and agrees to continue to work on their treatment plan. They have identified and are working toward their discharge goals.  yes    Visit start and stop times:    11/15/23  Start Time: 0930  Stop Time: 1020  Total Visit Time: 50 minutes

## 2023-11-28 ENCOUNTER — TELEMEDICINE (OUTPATIENT)
Dept: BEHAVIORAL/MENTAL HEALTH CLINIC | Facility: CLINIC | Age: 28
End: 2023-11-28

## 2023-11-28 DIAGNOSIS — F43.22 ADJUSTMENT DISORDER WITH ANXIOUS MOOD: Primary | ICD-10-CM

## 2023-11-28 PROCEDURE — 90834 PSYTX W PT 45 MINUTES: CPT | Performed by: SOCIAL WORKER

## 2023-11-28 NOTE — PSYCH
Behavioral Health Psychotherapy Progress Note    Psychotherapy Provided: Individual Psychotherapy     1. Adjustment disorder with anxious mood            Goals addressed in session: Goal 1     DATA: Nora reports feeling ok. She has gotten through the holidays relatively unscathed and did share that she is overall looking forward to the [place] season. We talked about loneliness as a concept that she is constantly trying to prevent (people pleasing, not wanting to be abandoned, always wanting community) but how despite all of her best efforts, she still feels disconnected. We processed living for others vs. Living for herself and how should she decide to be more truthful to herself, she may not know what will come next, but she may feel relief and unburdened - a state of being that she is able to connect with from time to time and knows that it is because she has set a boundary and/or distanced herself from people that do not value her. Agreed that she would talk to her PCP about an antidepressant since she is not getting anywhere within this service line. Did confirm that she is not in crisis. During this session, this clinician used the following therapeutic modalities: Client-centered Therapy, Cognitive Behavioral Therapy, Cognitive Processing Therapy, and Supportive Psychotherapy    Substance Abuse was not addressed during this session. If the client is diagnosed with a co-occurring substance use disorder, please indicate any changes in the frequency or amount of use: N/A. Stage of change for addressing substance use diagnoses: No substance use/Not applicable    ASSESSMENT:  Vesta Mercado presents with a Euthymic/ normal mood. her affect is Normal range and intensity, which is congruent, with her mood and the content of the session. The client has made progress on their goals. During this session the client was oriented to person, place, and time. The client was engaged in the session.  The client was able to sustain direct eye contact and was without psychomotor agitation. The client's thought processes were linear and clear. Roly Bailey presents with a none risk of suicide, none risk of self-harm, and none risk of harm to others. For any risk assessment that surpasses a "low" rating, a safety plan must be developed. A safety plan was indicated: no  If yes, describe in detail N/A    PLAN: Between sessions, Roly Bailey will practice self soothing and self advocacy skills. At the next session, the therapist will use Client-centered Therapy, Cognitive Behavioral Therapy, Cognitive Processing Therapy, and Supportive Psychotherapy to address stress. Behavioral Health Treatment Plan and Discharge Planning: Roly Bailey is aware of and agrees to continue to work on their treatment plan. They have identified and are working toward their discharge goals.  yes    Visit start and stop times:    11/28/23  Start Time: 0930  Stop Time: 1020  Total Visit Time: 50 minutes

## 2023-11-29 ENCOUNTER — TELEPHONE (OUTPATIENT)
Dept: PSYCHIATRY | Facility: CLINIC | Age: 28
End: 2023-11-29

## 2023-12-06 ENCOUNTER — TELEPHONE (OUTPATIENT)
Dept: PSYCHIATRY | Facility: CLINIC | Age: 28
End: 2023-12-06

## 2023-12-06 NOTE — TELEPHONE ENCOUNTER
Contacted patient off of Medication Management  to verify needs of services in attempts to offer patient an appointment at [facility] . LVM for patient to contact intake dept  in regards to an appointment with [facility].

## 2023-12-08 NOTE — TELEPHONE ENCOUNTER
Christiano Foote  requested a call back to discuss scheduling an appt. .    They can be reached at P# [phone]. Thank you.

## 2023-12-11 NOTE — TELEPHONE ENCOUNTER
Contacted patient off of Medication Management  to verify needs of services in attempts to offer patient an appointment at [facility] . LVM for patient to contact intake dept  in regards to an appointment with [facility].

## 2024-01-09 ENCOUNTER — SOCIAL WORK (OUTPATIENT)
Dept: BEHAVIORAL/MENTAL HEALTH CLINIC | Facility: CLINIC | Age: 29
End: 2024-01-09

## 2024-01-09 DIAGNOSIS — F43.22 ADJUSTMENT DISORDER WITH ANXIOUS MOOD: ICD-10-CM

## 2024-01-09 DIAGNOSIS — F40.10 SOCIAL ANXIETY DISORDER: Primary | ICD-10-CM

## 2024-01-09 PROCEDURE — 90834 PSYTX W PT 45 MINUTES: CPT | Performed by: SOCIAL WORKER

## 2024-01-09 NOTE — BH TREATMENT PLAN
"Outpatient Behavioral Health Psychotherapy Treatment Plan    Nora Harp  1995     Date of Initial Psychotherapy Assessment: 1/9/2024   Date of Current Treatment Plan: 01/09/24  Treatment Plan Target Date: 1/9/2024  Treatment Plan Expiration Date: 1/9/2024    Diagnosis:   1. Social anxiety disorder        2. Adjustment disorder with anxious mood            Area(s) of Need: Anxiety within relationships     Long Term Goal 1 (in the client's own words): \"Address insecure attachments\"    Stage of Change: Preparation    Target Date for completion: 7/9/2024     Anticipated therapeutic modalities: Psychoeducation, motivational interviewing, CBT, ACT, DBT, somatic exercises, mindfulness skills training, and supportive psychotherapy.      People identified to complete this goal: Nora Harp (client) and Layla Bermudez (clinician)      Objective 1: (identify the means of measuring success in meeting the objective): Acknowledge insecurities within relationships as they arise and process what this means about Nora (\"what I am fearing\").      Objective 2: (identify the means of measuring success in meeting the objective): Verbalize issues as they arise.      Long Term Goal 2 (in the client's own words): \"Invest in my health\".     Stage of Change: Preparation    Target Date for completion: 7/9/2024     Anticipated therapeutic modalities: Psychoeducation, motivational interviewing, CBT, ACT, DBT, somatic exercises, mindfulness skills training, and supportive psychotherapy.      People identified to complete this goal: Nora Harp (client) and Layla Bermudez (client)      Objective 1: (identify the means of measuring success in meeting the objective): Be more intentional about mindless consumption.      Objective 2: (identify the means of measuring success in meeting the objective): Participate in activities which promote healthy living.      I am currently under the care of a [facility] psychiatric provider: " no    My [facility] psychiatric provider is: none    I am currently taking psychiatric medications:  none    I feel that I will be ready for discharge from mental health care when I reach the following (measurable goal/objective): Not yet known.     For children and adults who have a legal guardian:   Has there been any change to custody orders and/or guardianship status? NA. If yes, attach updated documentation.    I have created my Crisis Plan and have been offered a copy of this plan    Behavioral Health Treatment Plan  Luke: Diagnosis and Treatment Plan explained to Nora Harp acknowledges an understanding of their diagnosis. Nora Harp agrees to this treatment plan.    I have been offered a copy of this Treatment Plan. yes

## 2024-01-09 NOTE — PSYCH
"Behavioral Health Psychotherapy Progress Note    Psychotherapy Provided: Individual Psychotherapy     1. Social anxiety disorder        2. Adjustment disorder with anxious mood            Goals addressed in session: Goal 1     DATA: Nora reported feeling well. We spent the duration of her appointment reviewing her treatment plan and discussing insights gained from the beginning of the new year. We talked about deep rooted insecurities within her relationships and how that spirals into mindless behaviors to help increase mood. She verbalized motivation towards being more intentional about her choices and authentic within her relationships. She has still not yet been able to connect with scheduling regarding a medication monitoring appointment.   During this session, this clinician used the following therapeutic modalities: Cognitive Processing Therapy and Supportive Psychotherapy    Substance Abuse was not addressed during this session. If the client is diagnosed with a co-occurring substance use disorder, please indicate any changes in the frequency or amount of use: NA. Stage of change for addressing substance use diagnoses: No substance use/Not applicable    ASSESSMENT:  Nora Harp presents with a Euthymic/ normal mood.     her affect is Normal range and intensity, which is congruent, with her mood and the content of the session. The client has made progress on their goals.    During this session the client was oriented to person, place, and time. The client was engaged in the session. The client was able to sustain direct eye contact and was without psychomotor agitation. The client's thought processes were linear and clear.   Nora Harp presents with a none risk of suicide, none risk of self-harm, and none risk of harm to others.    For any risk assessment that surpasses a \"low\" rating, a safety plan must be developed.    A safety plan was indicated: no  If yes, describe in detail NA    PLAN: Between " sessions, Nora Harp will practice at least 1 positive coping strategies daily including healthy physical habits and journaling. At the next session, the therapist will use Cognitive Behavioral Therapy, Cognitive Processing Therapy, Mindfulness-based Strategies, and Supportive Psychotherapy to address stagnation.    Behavioral Health Treatment Plan and Discharge Planning: Nora Harp is aware of and agrees to continue to work on their treatment plan. They have identified and are working toward their discharge goals. yes    Visit start and stop times:    01/09/24  Start Time: 0930  Stop Time: 1020  Total Visit Time: 50 minutes

## 2024-01-23 ENCOUNTER — SOCIAL WORK (OUTPATIENT)
Dept: BEHAVIORAL/MENTAL HEALTH CLINIC | Facility: CLINIC | Age: 29
End: 2024-01-23
Payer: COMMERCIAL

## 2024-01-23 DIAGNOSIS — F43.22 ADJUSTMENT DISORDER WITH ANXIOUS MOOD: Primary | ICD-10-CM

## 2024-01-23 PROCEDURE — 90834 PSYTX W PT 45 MINUTES: CPT | Performed by: SOCIAL WORKER

## 2024-01-23 NOTE — PSYCH
Behavioral Health Psychotherapy Progress Note    Psychotherapy Provided: Individual Psychotherapy     1. Adjustment disorder with anxious mood            Goals addressed in session: Goal 1 and Goal 2     DATA: Nora reports feeling well. She has had difficult conversations with both Aunt Beba and Amy this past week which felt good. She is getting better at confronting these situations as they arise and with confidence that she is doing what is best for her. She has still not yet looked for a job which we talked about. Writer helped her assess what is getting in the way and we did discuss a central theme of wanting control and someone to help her. We talked about the differences between relationship needs and self care needs, which she was receptive to.  Completed the safety plan as it was due. Denied all issues with suicidal ideation, plan, or intent.   During this session, this clinician used the following therapeutic modalities: Cognitive Behavioral Therapy, Cognitive Processing Therapy, and Supportive Psychotherapy    Substance Abuse was not addressed during this session. If the client is diagnosed with a co-occurring substance use disorder, please indicate any changes in the frequency or amount of use: NA. Stage of change for addressing substance use diagnoses: No substance use/Not applicable    ASSESSMENT:  Nora Harp presents with a Euthymic/ normal mood.     her affect is Normal range and intensity, which is congruent, with her mood and the content of the session. The client has made progress on their goals.    During this session the client was oriented to person, place, and time. The client was engaged in the session. The client was able to sustain direct eye contact and was without psychomotor agitation. The client's thought processes were linear and clear.   Nroa Harp presents with a none risk of suicide, none risk of self-harm, and none risk of harm to others.    For any risk assessment  "that surpasses a \"low\" rating, a safety plan must be developed.    A safety plan was indicated: no  If yes, describe in detail NA    PLAN: Between sessions, Nora Harp will take more of an initiative with respect to finding a job. At the next session, the therapist will use Cognitive Behavioral Therapy, Cognitive Processing Therapy, Motivational Interviewing, and Supportive Psychotherapy to address stress.    Behavioral Health Treatment Plan and Discharge Planning: Nora Harp is aware of and agrees to continue to work on their treatment plan. They have identified and are working toward their discharge goals. yes    Visit start and stop times:    01/23/24     " Patent

## 2024-01-23 NOTE — BH CRISIS PLAN
"Client Name: Nora Harp       Client YOB: 1995    [facility]illes Safety Plan      Creation Date: 1/23/24 Update Date: 1/23/25   Created By: Layla Bermudez LCSW       Step 1: Warning Signs:   Warning Signs   \"It's not a thing I have ever thought about\".            Step 2: Internal Coping Strategies:   Internal Coping Strategies   \"I would go to the movies\".            Step 3: People and social settings that provide distraction:   Name Contact Information   Family, friends In cell phone    Places   [employer], the movies, houses of family & friends, Home Goods           Step 4: People whom I can ask for help during a crisis:      Name Contact Information    Friends, famiy, mom In cell phone      Step 5: Professionals or agencies I can contact during a crisis:      Clinican/Agency Name Phone Emergency Contact    [facility] [phone]       [facility] Emergency Department Emergency Department Phone Emergency Department Address    [facility] [phone]         Crisis Phone Numbers:   Suicide Prevention Lifeline: Call or Text  731 Crisis Text Line: Text HOME to 044-784   Please note: Some [facility] do not have a separate number for Child/Adolescent specific crisis. If your county is not listed under Child/Adolescent, please call the adult number for your county      Adult Crisis Numbers: Child/Adolescent Crisis Numbers   [facility]: [phone] [facility]: [phone]   [facility]: [phone] [facility]: [phone]   [facility]: [phone] [place]: [phone]   [facility]: [phone] [place]: [phone]   [place] [facility]: [phone]   [facility]: [phone]   [facility]: [phone]   [facility]: [phone] (daytime) [phone] (after hours, weekends, holidays)      Step 6: Making the environment safer (plan for lethal means safety):   Patient did not identify any " "lethal methods: Yes     Optional: What is most important to me and worth living for?   \"For my family, to make this world a better place, to have all of the human experiences made available to me, to make people feel like they belong\".     [facility] Safety Plan. Angela Gautam and Arik Harp. Used with permission of the authors.           "

## 2024-02-06 ENCOUNTER — SOCIAL WORK (OUTPATIENT)
Dept: BEHAVIORAL/MENTAL HEALTH CLINIC | Facility: CLINIC | Age: 29
End: 2024-02-06
Payer: COMMERCIAL

## 2024-02-06 DIAGNOSIS — F43.22 ADJUSTMENT DISORDER WITH ANXIOUS MOOD: Primary | ICD-10-CM

## 2024-02-06 PROCEDURE — 90834 PSYTX W PT 45 MINUTES: CPT | Performed by: SOCIAL WORKER

## 2024-02-06 NOTE — PSYCH
Behavioral Health Psychotherapy Progress Note    Psychotherapy Provided: Individual Psychotherapy     1. Adjustment disorder with anxious mood            Goals addressed in session: Goal 1     DATA: Nora reported feeling well today. She denied significant shifts in mood and in sleep. She shared speaking to her former friend Jacki over this past weekend which was therapeutic in the sense that she was able to communicate her feelings effectively and firmly. She has been tolerating the disappointment of them not being received well and is thus able to more smoothly transition into acceptance of what the situation is. We referred to her inner child wounds of abandonment and internalized blame and shame. We reframed the pain as opportunities to care for her inner child, which she was receptive to. Her birthday is next week and she is looking forward to it because her friends are throwing her a party.   During this session, this clinician used the following therapeutic modalities: Cognitive Processing Therapy and Supportive Psychotherapy    Substance Abuse was not addressed during this session. If the client is diagnosed with a co-occurring substance use disorder, please indicate any changes in the frequency or amount of use: NA. Stage of change for addressing substance use diagnoses: No substance use/Not applicable    ASSESSMENT:  Nora Harp presents with a Euthymic/ normal mood.     her affect is Normal range and intensity, which is congruent, with her mood and the content of the session. The client has made progress on their goals.    During this session the client was oriented to person, place, and time. The client was engaged in the session. The client was able to sustain direct eye contact and was without psychomotor agitation. The client's thought processes were linear and clear.   Nora Harp presents with a none risk of suicide, none risk of self-harm, and none risk of harm to others.    For any  "risk assessment that surpasses a \"low\" rating, a safety plan must be developed.    A safety plan was indicated: no  If yes, describe in detail NA    PLAN: Between sessions, Nora Harp will practice self care and CBT skills as needed. At the next session, the therapist will use Cognitive Behavioral Therapy, Cognitive Processing Therapy, and Supportive Psychotherapy to address stress within her relationships.    Behavioral Health Treatment Plan and Discharge Planning: Nora Hrap is aware of and agrees to continue to work on their treatment plan. They have identified and are working toward their discharge goals. yes    Visit start and stop times:    02/06/24  Start Time: 0930  Stop Time: 1020  Total Visit Time: 50 minutes  "

## 2024-02-20 ENCOUNTER — TELEPHONE (OUTPATIENT)
Dept: PSYCHIATRY | Facility: CLINIC | Age: 29
End: 2024-02-20

## 2024-02-20 NOTE — TELEPHONE ENCOUNTER
Patient's mom  is calling regarding cancelling an appointment.    Date/Time: 2/20/2024 @ 9:30 am    Reason: pt is very sick---too sick to do virtual r/s appt    Patient was rescheduled: YES [x] NO []  If yes, when was Patient reschedule for: 2/28/2024    Patient requesting call back to reschedule: YES [] NO [x]

## 2024-02-21 PROBLEM — Z01.419 ENCOUNTER FOR ANNUAL ROUTINE GYNECOLOGICAL EXAMINATION: Status: RESOLVED | Noted: 2022-05-12 | Resolved: 2024-02-21

## 2024-03-04 DIAGNOSIS — L70.0 ACNE VULGARIS: ICD-10-CM

## 2024-03-04 RX ORDER — SPIRONOLACTONE 100 MG/1
100 TABLET, FILM COATED ORAL DAILY
Qty: 90 TABLET | Refills: 4 | Status: SHIPPED | OUTPATIENT
Start: 2024-03-04 | End: 2024-06-02

## 2024-03-04 RX ORDER — SPIRONOLACTONE 100 MG/1
100 TABLET, FILM COATED ORAL DAILY
Qty: 90 TABLET | Refills: 0 | Status: SHIPPED | OUTPATIENT
Start: 2024-03-04 | End: 2024-06-02

## 2024-03-04 NOTE — TELEPHONE ENCOUNTER
Patient informed by MR that tretinoin is not covered by insurance any longer and to use good rx coupon to reduce out of pocket cost

## 2024-03-04 NOTE — TELEPHONE ENCOUNTER
Please let patient know it is not covered by her insurance any longer.  She may want to use a good rx coupon to reduce the cost if she wants to pay out of pocket

## 2024-03-04 NOTE — TELEPHONE ENCOUNTER
Patient scheduled her appointment for April but will like to see if a refill of the medication could be send to the pharmacy       Please Advise

## 2024-03-04 NOTE — TELEPHONE ENCOUNTER
----- Message from Nora Harp sent at 3/4/2024  1:04 PM EST -----  Regarding: Spironolactone 100MG Refill   Contact: [phone]  Hi Dr. Crowe,     I'm wondering if you could please refill my Spironolactone 100mg prescription for hormonal acne? I will book my annual apt, but am worried I will not be able to schedule an appointment before my RX runs out. I've been on this medicine for 5+ years tolerating it well.     Thank you,   Nora Harp

## 2024-03-04 NOTE — TELEPHONE ENCOUNTER
Patient called the RX Refill Line. Message is being forwarded to the office.     Patient is requesting -Pt called to check refill status. Pt is aware she is due for an appt. Pt would like a call back to schedule and also a courtesy refill until her appt. Please review. Thank you.    Please contact patient at - [phone]

## 2024-03-05 ENCOUNTER — SOCIAL WORK (OUTPATIENT)
Dept: BEHAVIORAL/MENTAL HEALTH CLINIC | Facility: CLINIC | Age: 29
End: 2024-03-05
Payer: COMMERCIAL

## 2024-03-05 DIAGNOSIS — F43.22 ADJUSTMENT DISORDER WITH ANXIOUS MOOD: Primary | ICD-10-CM

## 2024-03-05 PROCEDURE — 90834 PSYTX W PT 45 MINUTES: CPT | Performed by: SOCIAL WORKER

## 2024-03-05 NOTE — PSYCH
"Behavioral Health Psychotherapy Progress Note    Psychotherapy Provided: Individual Psychotherapy     1. Adjustment disorder with anxious mood            Goals addressed in session: Goal 1     DATA: Nora shared having a nice birthday. She spent it with friends and did not have to plan anything which was nice for a change. We talked about her process in taking a step back from her relationships in the sense that she does not have to control it in order for it to thrive, and that if they inevitably become distant, that isn't necessarily a bad thing. We reviewed ways in which she has been able to practice leaning into all of her parts and how that has transcended into acceptance and honesty within herself. She thanked writer for helping her with this process. No new issues impacting mood or sleep.  During this session, this clinician used the following therapeutic modalities: Cognitive Behavioral Therapy, Cognitive Processing Therapy, and Supportive Psychotherapy    Substance Abuse was not addressed during this session. If the client is diagnosed with a co-occurring substance use disorder, please indicate any changes in the frequency or amount of use: NA. Stage of change for addressing substance use diagnoses: No substance use/Not applicable    ASSESSMENT:  Nora Harp presents with a Euthymic/ normal mood.     her affect is Normal range and intensity, which is congruent, with her mood and the content of the session. The client has made progress on their goals.    During this session the client was oriented to person, place, and time. The client was engaged in the session. The client was able to sustain direct eye contact and was without psychomotor agitation. The client's thought processes were linear and clear.   Nora Harp presents with a none risk of suicide, none risk of self-harm, and none risk of harm to others.    For any risk assessment that surpasses a \"low\" rating, a safety plan must be " developed.    A safety plan was indicated: no  If yes, describe in detail NA    PLAN: Between sessions, Nora Harp will practice relaxation and CBT skills. At the next session, the therapist will use Cognitive Behavioral Therapy, Cognitive Processing Therapy, and Supportive Psychotherapy to address stress within relationships.    Behavioral Health Treatment Plan and Discharge Planning: Nora Harp is aware of and agrees to continue to work on their treatment plan. They have identified and are working toward their discharge goals. yes    Visit start and stop times:    03/05/24  Start Time: 0930  Stop Time: 1020  Total Visit Time: 50 minutes

## 2024-03-19 ENCOUNTER — SOCIAL WORK (OUTPATIENT)
Dept: BEHAVIORAL/MENTAL HEALTH CLINIC | Facility: CLINIC | Age: 29
End: 2024-03-19
Payer: COMMERCIAL

## 2024-03-19 DIAGNOSIS — F43.22 ADJUSTMENT DISORDER WITH ANXIOUS MOOD: Primary | ICD-10-CM

## 2024-03-19 PROCEDURE — 90834 PSYTX W PT 45 MINUTES: CPT | Performed by: SOCIAL WORKER

## 2024-03-19 NOTE — PSYCH
Behavioral Health Psychotherapy Progress Note    Psychotherapy Provided: Individual Psychotherapy     1. Adjustment disorder with anxious mood            Goals addressed in session: Goal 1     DATA: Nora reports feeling well overall. She is just returning from a vacation with her friends to [place], which went well. She continues to report working on establishing limits for herself and leaning into uncomfortable feelings. We discussed several scenarios wherein she set limits, contemplated them afterwards, and then ultimately made good on her initial decision. We acknowledged that it is a process and one of breaking a behavioral pattern, which ultimately takes time and reinforcement. We discussed different cognitive diffusion skills paired with breath work which she was receptive to, and agreed that she would practice this within the next 2 weeks, to be discussed during her next appt.   During this session, this clinician used the following therapeutic modalities: Cognitive Behavioral Therapy, Cognitive Processing Therapy, Mindfulness-based Strategies, and Supportive Psychotherapy    Substance Abuse was not addressed during this session. If the client is diagnosed with a co-occurring substance use disorder, please indicate any changes in the frequency or amount of use: NA. Stage of change for addressing substance use diagnoses: No substance use/Not applicable    ASSESSMENT:  Nora Harp presents with a Euthymic/ normal mood.     her affect is Normal range and intensity, which is congruent, with her mood and the content of the session. The client has made progress on their goals.    During this session the client was oriented to person, place, and time. The client was engaged in the session. The client was able to sustain direct eye contact and was without psychomotor agitation. The client's thought processes were linear and clear.   Nora Harp presents with a none risk of suicide, none risk of self-harm,  "and none risk of harm to others.    For any risk assessment that surpasses a \"low\" rating, a safety plan must be developed.    A safety plan was indicated: no  If yes, describe in detail NA    PLAN: Between sessions, Nora Harp will practice CBT and grounding techniques. At the next session, the therapist will use Cognitive Behavioral Therapy, Cognitive Processing Therapy, Mindfulness-based Strategies, and Supportive Psychotherapy to address stress within relationships.    Behavioral Health Treatment Plan and Discharge Planning: Nora Harp is aware of and agrees to continue to work on their treatment plan. They have identified and are working toward their discharge goals. yes    Visit start and stop times:    03/19/24  Start Time: 0930  Stop Time: 1020  Total Visit Time: 50 minutes  "

## 2024-04-02 ENCOUNTER — SOCIAL WORK (OUTPATIENT)
Dept: BEHAVIORAL/MENTAL HEALTH CLINIC | Facility: CLINIC | Age: 29
End: 2024-04-02
Payer: COMMERCIAL

## 2024-04-02 DIAGNOSIS — F40.10 SOCIAL ANXIETY DISORDER: Primary | ICD-10-CM

## 2024-04-02 PROCEDURE — 90834 PSYTX W PT 45 MINUTES: CPT | Performed by: SOCIAL WORKER

## 2024-04-02 NOTE — PSYCH
"Behavioral Health Psychotherapy Progress Note    Psychotherapy Provided: Individual Psychotherapy     1. Social anxiety disorder            Goals addressed in session: Goal 1     DATA: Nora reports feeling well overall. She had a nice Easter with family friends and had time to catch up with out of town friends as well. She discussed a couple of different scenarios wherein her people pleasing tendencies were called into question and she was able to successfully set limits unapologetically. She shared feeling proud of herself for this. We discussed the weight of \"always needing to move forward and be resilient\" as well as subsequent loss of feeling cared for. We discussed how this impacts her avoidant attachment style and dating. She became tearful while unpacking this stating that she does fear having a child and her mother not being there. Reviewed calming techniques such as visualization, affirmations of care, and breath work.   During this session, this clinician used the following therapeutic modalities: Cognitive Behavioral Therapy, Cognitive Processing Therapy, EMDR (or other form of bilateral Stimulation), Mindfulness-based Strategies, and Supportive Psychotherapy    Substance Abuse was not addressed during this session. If the client is diagnosed with a co-occurring substance use disorder, please indicate any changes in the frequency or amount of use: NA. Stage of change for addressing substance use diagnoses: No substance use/Not applicable    ASSESSMENT:  Nora Harp presents with a Euthymic/ normal mood.     her affect is Normal range and intensity, which is congruent, with her mood and the content of the session. The client has made progress on their goals.    During this session the client was oriented to person, place, and time. The client was engaged in the session. The client was able to sustain direct eye contact and was without psychomotor agitation. The client's thought processes were linear " "and clear.   Nora Harp presents with a none risk of suicide, none risk of self-harm, and none risk of harm to others.    For any risk assessment that surpasses a \"low\" rating, a safety plan must be developed.    A safety plan was indicated: no  If yes, describe in detail NA    PLAN: Between sessions, Nora Harp will take medication as prescribed and practice positive coping strategies as needed . At the next session, the therapist will use Cognitive Behavioral Therapy, Cognitive Processing Therapy, Mindfulness-based Strategies, and Supportive Psychotherapy to address stressors.    Behavioral Health Treatment Plan and Discharge Planning: Nora Harp is aware of and agrees to continue to work on their treatment plan. They have identified and are working toward their discharge goals. yes    Visit start and stop times:    04/02/24  Start Time: 0930  Stop Time: 1020  Total Visit Time: 50 minutes  "

## 2024-04-16 ENCOUNTER — SOCIAL WORK (OUTPATIENT)
Dept: BEHAVIORAL/MENTAL HEALTH CLINIC | Facility: CLINIC | Age: 29
End: 2024-04-16
Payer: COMMERCIAL

## 2024-04-16 DIAGNOSIS — F40.10 SOCIAL ANXIETY DISORDER: Primary | ICD-10-CM

## 2024-04-16 PROCEDURE — 90834 PSYTX W PT 45 MINUTES: CPT | Performed by: SOCIAL WORKER

## 2024-04-16 NOTE — PSYCH
"Behavioral Health Psychotherapy Progress Note    Psychotherapy Provided: Individual Psychotherapy     1. Social anxiety disorder            Goals addressed in session: Goal 1     DATA: Nora used this time to talk about patterns of over thinking. We examined these fully and reviewed helpful skills such as challenging faulty beliefs and in other scenarios, using cognitive diffusion skills such as the Leaves on the Stream meditation as a way to disconnect from intrusive thoughts. We applied these to work/performance related issues to which she still admits to feeling unprepared to start a new job opportunity. We looked at ways to ask for and receive help, as well as, to affirm readiness to accept accountability for what ever comes next.   During this session, this clinician used the following therapeutic modalities: Cognitive Behavioral Therapy    Substance Abuse was not addressed during this session. If the client is diagnosed with a co-occurring substance use disorder, please indicate any changes in the frequency or amount of use: NA. Stage of change for addressing substance use diagnoses: No substance use/Not applicable    ASSESSMENT:  Nora Harp presents with a Euthymic/ normal mood.     her affect is Normal range and intensity, which is congruent, with her mood and the content of the session. The client has made progress on their goals.    During this session the client was oriented to person, place, and time. The client was engaged in the session. The client was able to sustain direct eye contact and was without psychomotor agitation. The client's thought processes were linear and clear.   Nora Harp presents with a none risk of suicide, none risk of self-harm, and none risk of harm to others.    For any risk assessment that surpasses a \"low\" rating, a safety plan must be developed.    A safety plan was indicated: no  If yes, describe in detail NA    PLAN: Between sessions, Nora Harp will talk to " her friends about work related issues and receive their help. At the next session, the therapist will use Cognitive Behavioral Therapy to address stressors.    Behavioral Health Treatment Plan and Discharge Planning: Nora Harp is aware of and agrees to continue to work on their treatment plan. They have identified and are working toward their discharge goals. yes    Visit start and stop times:    04/16/24  Start Time: 0930  Stop Time: 1020  Total Visit Time: 50 minutes

## 2024-04-30 ENCOUNTER — OFFICE VISIT (OUTPATIENT)
Age: 29
End: 2024-04-30
Payer: COMMERCIAL

## 2024-04-30 VITALS — BODY MASS INDEX: 30.82 KG/M2 | TEMPERATURE: 97 F | WEIGHT: 174 LBS

## 2024-04-30 DIAGNOSIS — D22.71 MULTIPLE BENIGN MELANOCYTIC NEVI OF UPPER AND LOWER EXTREMITIES AND TRUNK: ICD-10-CM

## 2024-04-30 DIAGNOSIS — D22.62 MULTIPLE BENIGN MELANOCYTIC NEVI OF UPPER AND LOWER EXTREMITIES AND TRUNK: ICD-10-CM

## 2024-04-30 DIAGNOSIS — D22.72 MULTIPLE BENIGN MELANOCYTIC NEVI OF UPPER AND LOWER EXTREMITIES AND TRUNK: ICD-10-CM

## 2024-04-30 DIAGNOSIS — D22.5 MULTIPLE BENIGN MELANOCYTIC NEVI OF UPPER AND LOWER EXTREMITIES AND TRUNK: ICD-10-CM

## 2024-04-30 DIAGNOSIS — L81.4 LENTIGO: ICD-10-CM

## 2024-04-30 DIAGNOSIS — D22.61 MULTIPLE BENIGN MELANOCYTIC NEVI OF UPPER AND LOWER EXTREMITIES AND TRUNK: ICD-10-CM

## 2024-04-30 DIAGNOSIS — L70.0 ACNE VULGARIS: Primary | ICD-10-CM

## 2024-04-30 PROCEDURE — 99214 OFFICE O/P EST MOD 30 MIN: CPT | Performed by: DERMATOLOGY

## 2024-04-30 RX ORDER — SPIRONOLACTONE 100 MG/1
100 TABLET, FILM COATED ORAL DAILY
Qty: 180 TABLET | Refills: 3 | Status: SHIPPED | OUTPATIENT
Start: 2024-04-30 | End: 2026-04-20

## 2024-04-30 NOTE — PATIENT INSTRUCTIONS
"ACNE VULGARIS (\"COMMON ACNE\")    Assessment and Plan:  We reviewed the causes of acne, the “kinds” of acne, and the expected clinical course.  We discussed treatment options ranging from over-the-counter products, topical retinoids, antibiotics, BP, hormonal therapies (OCPs/spironolactone), and isotretinoin (Accutane).  We reviewed specific over-the-counter interventions and medications. Recommended typical hygiene measures including water-based facial products, washing regularly with mild cleanser, and refraining from picking and popping any pimples.   Recommended non-comedogenic sunscreen use daily.   Expectations of therapy discussed. Side effects, risks and benefits of medications discussed.  A comprehensive handout on Acne was provided.  The phone number to call in case of questions or concerns (and instructions to stop medications in such a scenario) was provided.  After lengthy discussion of etiology and treatment options, we decided to implement the following personalized treatment plan:    Based on a thorough discussion of this condition and the management approach to it (including a comprehensive discussion of the known risks, side effects and potential benefits of treatment), the patient (family) agrees to implement the following specific plan:    --------------------------------------------------------------------------------------  YOUR PERSONALIZED ACNE ACTION PLAN    “MORNING ROUTINE”     Continue with taking the Spironolactone 100 mg       “EVENING ROUTINE”    SKIN HYGIENE:  In the shower, wash your face, chest and back gently with Cetaphil moisturizing cleanser or Dove Fragrance-free bar.  Do not use a lufa or washcloth as these tend to be too irritating to acne-prone skin.    TOPICAL RETINOID:  At 1 hour before bedtime (after washing your face and allowing the skin to completely dry), spread only a single pea-sized amount of this medication evenly over your entire face (avoiding your eyes or " mouth):  Continue with the Tretinoin 0.025% micro cream one hour before bedtime    REMEMBER:  Always take your acne pills with lots of water!  A pill stuck in your throat can cause significant burning and irritation.   Drink a full glass of water to ensure the pill gets into your stomach.  Avoid “popping” a pill right before bed, and stay upright for at least 1 hour after taking a pill.      ACNE:  WHAT ZIT ALL ABOUT?    WHY DO I HAVE ACNE/PIMPLES?  Your skin is made of layers.  To keep the skin from becoming dry and cracked, the skin needs oil. The oil is made in little wells in the deeper layers in the skin.  People with acne have glands that make more oil and are more easily plugged, causing the glands to swell. Hormones, bacteria and your inherited tendency to have acne all play a role.    The medical term for “pimples” is acne or acne vulgaris (vulgaris means “common”). Most people get some acne. Acne does not come from being dirty.  Instead, it is an expected consequence of changes that occur during normal growth and development. Hormones, bacteria, and your family's tendency to have acne may all play a role.     “Whiteheads” or “blackheads” are openings of the glands (glands are the oil factories) onto the surface of the skin. “Blackheads” are not caused by dirt blocking the pores; instead, they result from the oxidation reaction of oil and skin in the pores with the air (like a “rust” reaction).        WHAT ABOUT STRESS?  Stress does not “cause” acne but it can make it worse. Make sure you get enough sleep and daily exercise!     WHAT ABOUT FOODS/DIET?  Try to eat a balanced, healthy diet. Some people feel that certain foods worsen their acne. While there aren't many studies available on this question, severe dietary changes are unlikely to help your acne and may be harmful to the health of your skin. If you find that a certain food seems to aggravate your acne, you may consider avoiding that food. Discuss  this with your physician!    WHAT CAUSES MY ACNE?  There are four contributors to acne--the body's natural oil (sebum), clogged pores, bacteria (with the scientific name Propionibacterium acnes, or P. acnes, for short), and the body's reaction to the bacteria living in the clogged pores (which causes inflammation). Here's what happens:    Sebum is produced in the normal oil-making glands in the deeper layers of the skin and reaches the surface through the skin's pores. An increase in certain hormones occurs around the time of puberty, and these hormones trigger the oil glands to produce increased amounts of sebum.  Pores with excess oil tend to become clogged more easily.  At the same time, P. acnes--one of the many types of bacteria that normally live on everyone's skin--thrives in the excess oil and causes a skin reaction (inflammation).  If a pore is clogged close to the surface, there is little inflammation. However, this results in the formation of “whiteheads” (closed comedones) or “blackheads” (open comedones) at the surface of the skin.  A plug that extends to, or forms a little deeper in the pore, or one that enlarges or ruptures may cause more inflammation. The result is red bumps (papules) and pus-filled pimples (pustules).  If plugging happens in the deepest skin layer, the inflammation may be even more severe, resulting in the formation of nodules or cysts. When these types of acne heal, they may leave behind discolored areas or true scars.    SKIN HYGIENE:  HOW SHOULD I WASH MY SKIN?  Acne does not come from being dirty, however, washing your face is part of taking good care of your skin and will help keep your face clear.  Good skin hygiene is, therefore, critical to support any acne treatment plan.  Here are several specific suggestions for practicing good skin hygiene and keeping your skin looking its best:    You should wash acne-prone skin TWICE A DAY: Once in the morning and once in the evening.  This does include any showers you take that day, so do not overdo it!  Do not scrub the skin with a washcloth or loofah as these can irritate and inflame your acne. Acne does not come from “dirt”, so it is not necessary to scrub the skin clean. In fact, scrubbing may lead to dryness and irritation that makes the acne even worse and harder for patients to tolerate acne medications.   Use a gentle facial moisturizing cleanser (Cetaphil Moisturizing Cleanser or Dove Fragrance-Free bar).  Avoid using soaps like Ivory, Dial, Felicity Spring, Lever, or soft/liquid soaps as these products will dry your skin.  Do not use any over-the-counter “acne washes” without your doctor's specific instruction to do so.  These products often contain salicylic acid or benzoyl peroxide. These ingredients can be helpful in clearing oil from the skin and reducing bacteria, but they may also be drying and can add to irritation.   Do not use exfoliating products with microbeads or brushes as these can cause irritation to the skin.   Facials and other treatments to remove, squeeze, or “clean out” pores are not recommended. Manipulating the skin in this way can make acne worse and can lead to severe infections and/or scarring. It also increases the likelihood that the skin will not be able to tolerate acne medications.   Try not to “pop pimples” or pick at your acne as this can delay healing and may result in scarring or skin color changes (“dark spots”) that are often more noticeable than the acne itself. Picking/popping acne can also cause a serious skin infection.  Wash or change your pillow case once to twice a week, especially if you use products in your hair.   Wash the skin as soon as possible after playing sports or other activities that cause a lot of sweating. Also, pay attention to how your sports equipment (shoulder pads, helmet strap, etc.) might be making your acne worse.  When you use makeup, moisturizer, or sunscreen make sure that  these products are labeled “non-comedogenic,” or “won't clog pores,” or “won't cause acne.”       SHOULD I TREAT MY ACNE?    There are a number of other skin conditions that can look like acne. If there is any question about the diagnosis, then the person should be evaluated by a board certified pediatric and adolescent dermatologist.  A physician should examine any child with acne who is between the ages of 1 and 7 years of age, as acne in this “mid-childhood” age group is not normal and may signal an underlying problem.   If a “preadolescent” (7 to 11 years of age) or “adolescent” (12 to 18 years of age) has mild acne and the condition is not bothersome to the individual, proper and regular skin care (what your doctor may call “skin hygiene”) may be all that is needed at this point  Many people do, however, need specific acne medications to help their skin look and feel its best. Your doctor will tell you if you are one of these people. If so, you may be advised to use an over-the-counter or prescription medication that is applied to the skin (a “topical medication”) or if the addition of an oral medication (a medication “taken by mouth”) is needed. The good news is that the medications work well when used properly!  Some specific factors that may influence the choice of acne therapy include:    Severity. The number and type of skin lesions (papules or comedones) and the degree of inflammation (mild, moderate or severe).  Scarring. Scarring is most common when acne is severe, but it can happen even in children with mild acne.  Impact. If a child is experiencing emotional complications because of the acne or is experiencing negative comments from other children.  Cost of the acne medications.  An acne expert can help to keep “out of pocket” costs to a minimum by utilizing the correct medications and the least expensive options.  The patient's skin type (“oily” versus “dry” or “combination skin,” for  example).  Potential side effects of the medication.  The ease or overall complexity of the treatment plan or medication.    WHAT ACNE TREATMENTS ARE AVAILABLE?    Medications for acne try to stop the formation of new pimples by reducing or removing the oil, bacteria, and other things (like dead skin cells) that clog the pores. They can also decrease the inflammation or irritation response of the skin to bacteria. It may take from 6 to 8 weeks (about 2 months!) before you see any improvement and know if the medication is effective. It takes the layers of skin this long to regenerate. Remember, these medications do not “cure” the condition--the acne improves because of the medication. Therefore, treatment must be continued in order to prevent the return of acne lesions.    There are many types of acne treatments. Some are applied to the skin (“topical” medications) and some are taken by mouth (“oral” medications). In most cases of mild acne, the doctor will start with a topical medication.  There are many different topical medications that are helpful for acne.  If acne is more severe and it does not respond adequately to a topical medication, or if it covers large body surface areas such as the back and/or chest, oral antibiotics such as Doxycycline or Minocycline and/or oral hormone therapy such as Oral Contraceptive Pills or Spironolactone may be prescribed. In the most severe cases, isotretinoin (Accutane) may be used.     In general, it is usually best to start with acne medications that are least likely to cause side effects but are at the same time capable of addressing the specific causes for the acne. Some patients have a good result with just one medication, but many will need to use a combination of treatments: two or more different topical agents or an oral medication plus a topical medication.     Another treatment used for acne may include corticosteroid injections, which are used to help relieve pain,  decrease the size, and encourage the healing of large, inflamed acne nodules. Also, dermatologists sometimes perform “acne surgery,” using a fine needle, a pointed blade, or an instrument known as a comedone extractor to mechanically clean out clogged pores. One must always weigh the risk for inducing a scar with the potential benefits of any procedure. Prior treatment with topical retinoids can “loosen” whiteheads and blackheads and make it easier to physically remove such lesions.     Heat-based devices, and light and laser therapy are being studied to see whether there is any role for such treatments in mild to moderate acne. At this time, there is not enough evidence to make general recommendations about their use.    TOPICAL ACNE MEDICATIONS    WHAT KIND OF TOPICALS ARE THERE?  Benzoyl peroxide (BP) helps to fight inflammation and is anti-microbial (kills bacteria, viruses, and other microorganisms) and is believed to help prevent resistance of bacteria to topical antibiotics. A benzoyl peroxide “wash” may be recommended for use on large areas such as the chest and/or back. Mild irritation and dryness are common when first using benzoyl peroxide-containing products. Be careful because benzoyl peroxide can bleach towels and clothing!  Retinoids (such as adapalene, tretinoin, or tazarotene) unplug the oil glands by helping peel away the layers of skin and other things plugging the opening of the glands. Mild irritation and dryness are common when first using these products. Facial waxing and other skin procedures can lead to excessive irritation and should be avoided during retinoid therapy.  Antibiotics fight bacteria and help decrease inflammation. Topical antibiotics commonly used in acne include clindamycin, erythromycin, and combination agents (such as clindamycin/benzoyl peroxide or erythromycin/benzoyl peroxide). Mild irritation and dryness are common when first using these products. Typically, topical  antibiotics should not be used alone as treatment for acne.  Other topical agents include salicylic acid, azelaic acid, dapsone, and sulfacetamide. Mild irritation and dryness can also occur when first using these products.    USING YOUR TOPICAL TREATMENTS LIKE A PRO  Apply topical medications only to clean, dry skin. Topical medications may lead to significant dryness of the affected areas. To minimize this, wait 15-20 minutes after washing before applying your topical medication.   These medications work deep in the skin to prevent new breakouts. “Spot treatment” of individual pimples does not do much. When applying topical medications to the face, use the “5-dot” method. Start by placing a small pea-sized amount of the medication on your finger. Then, place “dots” in each of five locations of your face: Mid-forehead, each cheek, nose, and chin. Next, rub the medication into the entire area of skin - not just on individual pimples! Try to avoid the delicate skin around your eyes and corners of your mouth.  The medications are not magic!  They take weeks if not months to work. Be patient and use your medicine on a daily basis or as directed for six weeks before asking if your skin looks better. Try not to miss more than one or two days each week when using your medications.  If you are starting a new medication, then try using it “every other night” or even “every third night.” Gradually work up to “once a day.”  This will give your skin time to adjust.  The same medications often come in various forms or formulations: Creams, ointments, lotions, gels, microspheres, or foams. Use the formulation that has been recommended and don't switch to other forms unless instructed. Some forms (such as alcohol based gels) may be more drying and less tolerable for certain skin types.  Sometimes individual medications are not as effective as a combination of two or more agents. The doctor may need to try several medications or  combinations before finding the one that is best for that patient.  Moisturizer, sunscreen, and make-up may be used in conjunction with topical acne medications. In general, acne medications are applied first so they may directly contact the skin. Ask your physician to review specific application instructions!  It is especially important to always use sunscreen when using a topical retinoid or oral antibiotic. These drugs can make your skin more sensitive to the sun. In general, sunscreen gets applied AFTER any acne medications.  Don't stop using your acne medications just because your acne got better. Remember, the acne is better because of the medication, and prevention is the fay to treatment.      ORAL ACNE MEDICATIONS    ORAL ANTIBIOTICS  Antibiotics include tetracycline-class medicines (which include the most commonly used oral antibiotics for acne, minocycline, and doxycycline), erythromycin, trimethoprim-sulfamethoxazole, and occasionally cephalexin or azithromycin. These drugs may decrease bacteria and inflammation, and they are most effective for moderate-to-severe “inflammatory” acne. A product containing benzoyl peroxide should be used along with these antibiotics to help decrease the possibility of microbial resistance.    Always take your acne pills with lots of water!  A pill stuck in your throat can cause significant burning and irritation.   Drink a full glass of water to ensure the pill gets into your stomach.  Avoid “popping” a pill right before bed, and stay upright for at least 1 hour after taking a pill.    DOXYCYCLINE   This medication is usually taken ONCE or TWICE per day, as instructed by your physician.   NOTE: Always take this medication with lots of water! A pill stuck in the throat can cause significant burning and irritation. Avoid “popping” a pill right before bed & stay upright for at least one hour after taking a pill.   WARNING: Doxycycline increases your sensitivity to the sun, so  practice excellent sun protection! If you notice any of the following, stop using the medication and notify your health care provider: headaches; blurred vision; dizziness; sun sensitivity; heartburn-stomach pain; irritation of the esophagus; darkening of scars, gums, or teeth (more often with minocycline); nail changes; yellowing of the eyes or skin (indicating possible liver disease); joint pains-and flu-like symptoms. Taking oral antibiotics with food may help with symptoms of upset stomach.   COMMON SIDE EFFECTS: Headaches; dizziness; sun sensitivity; irritation of the throat; discoloration of scars, gums, or teeth; nail changes.     MINOCYCLINE   This medication is usually taken ONCE or TWICE per day, as instructed by your physician.   NOTE: Always take this medication with lots of water! A pill stuck in the throat can cause significant burning and irritation. Avoid “popping” a pill right before bed & stay upright for at least one hour after taking a pill.   WARNING: Though less likely than doxycycline, minocycline may increase your sensitivity to the sun, so practice excellent sun protection! If you notice any of the following, stop using the medication and notify your health care provider: headaches; blurred vision; dizziness; sun sensitivity; heartburn-stomach pain; irritation of the throat; darkening of scars, gums, or teeth; nail changes; yellowing of the eyes or skin (indicating possible liver disease); joint pains-and flu-like symptoms. Taking oral antibiotics with food may help with symptoms of upset stomach.   COMMON SIDE EFFECTS: Headaches; dizziness; sun sensitivity; irritation of the throat; discoloration of scars, gums, or teeth (often with minocycline); nail changes.   Minocycline can rarely cause liver disease, joint pains, severe skin rashes, and flu-like symptoms. If you should notice yellowing of the eyes or skin, or any of the above, notify your doctor and stop using the medication  "immediately.     HAVING PROBLEMS WITH ANY OF YOUR TREATMENTS?    You should not be able to see any of the medicines on your face. If you can see a white film on your skin after you apply the medication, there is too much medicine in that area and you need to apply a thinner coat and make sure it is spread evenly on your face.      If your skin gets too dry, you can apply a light (“non-comedogenic”) moisturizer on top of your medicine or you may switch to using the medicine every other day instead of every day.  If your skin is still too irritated, you may need to switch to a milder medication.      If your skin is red and very itchy, you may be allergic to the medication and you should stop using it.      COMMON POSSIBLE SIDE EFFECTS OF MEDICATIONS    Retinoids - dryness, redness, increased sun sensitivity.  Benzoyl peroxide - drying, redness, bleaching of clothes, towels and sheets, allergy.  Doxycycline - headaches; dizziness; irritation of the throat; nail changes; discoloration of teeth.  Sun sensitivity - even if you have dark skin, this medicine can make you burn more easily.  Make sure you protect yourself from the sun, either by avoiding being outside between 11 AM and 3 PM, wearing and reapplying sunscreen/sunblock, or wearing sun protective clothing  Nausea/vomiting - if you experience nausea with this medication, take it with food.    WHEN AND WHERE TO CALL WITH CONCERNS  We are here to help!  If you experience any unusual symptoms, then stop taking or using the medication and call our office at [phone] ([phone]).  It is better to be safe than to be sorry!     MELANOCYTIC NEVI (\"Moles\")    Assessment and Plan:  Based on a thorough discussion of this condition and the management approach to it (including a comprehensive discussion of the known risks, side effects and potential benefits of treatment), the patient (family) agrees to implement the following specific plan:  When outside we recommend using " "a wide brim hat, sunglasses, long sleeve and pants, sunscreen with SPF 30+ with reapplication every 2 hours, or SPF specific clothing   Benign, reassured  Annual skin check     Melanocytic Nevi  Melanocytic nevi (\"moles\") are tan or brown, raised or flat areas of the skin which have an increased number of melanocytes. Melanocytes are the cells in our body which make pigment and account for skin color.    Some moles are present at birth (I.e., \"congenital nevi\"), while others come up later in life (i.e., \"acquired nevi\").  The sun can stimulate the body to make more moles.  Sunburns are not the only thing that triggers more moles.  Chronic sun exposure can do it too.     Clinically distinguishing a healthy mole from melanoma may be difficult, even for experienced dermatologists. The \"ABCDE's\" of moles have been suggested as a means of helping to alert a person to a suspicious mole and the possible increased risk of melanoma.  The suggestions for raising alert are as follows:    Asymmetry: Healthy moles tend to be symmetric, while melanomas are often asymmetric.  Asymmetry means if you draw a line through the mole, the two halves do not match in color, size, shape, or surface texture. Asymmetry can be a result of rapid enlargement of a mole, the development of a raised area on a previously flat lesion, scaling, ulceration, bleeding or scabbing within the mole.  Any mole that starts to demonstrate \"asymmetry\" should be examined promptly by a board certified dermatologist.     Border: Healthy moles tend to have discrete, even borders.  The border of a melanoma often blends into the normal skin and does not sharply delineate the mole from normal skin.  Any mole that starts to demonstrate \"uneven borders\" should be examined promptly by a board certified dermatologist.     Color: Healthy moles tend to be one color throughout.  Melanomas tend to be made up of different colors ranging from dark black, blue, white, or red.  " "Any mole that demonstrates a color change should be examined promptly by a board certified dermatologist.     Diameter: Healthy moles tend to be smaller than 0.6 cm in size; an exception are \"congenital nevi\" that can be larger.  Melanomas tend to grow and can often be greater than 0.6 cm (1/4 of an inch, or the size of a pencil eraser). This is only a guideline, and many normal moles may be larger than 0.6 cm without being unhealthy.  Any mole that starts to change in size (small to bigger or bigger to smaller) should be examined promptly by a board certified dermatologist.     Evolving: Healthy moles tend to \"stay the same.\"  Melanomas may often show signs of change or evolution such as a change in size, shape, color, or elevation.  Any mole that starts to itch, bleed, crust, burn, hurt, or ulcerate or demonstrate a change or evolution should be examined promptly by a board certified dermatologist.      LENTIGO    Assessment and Plan:  Based on a thorough discussion of this condition and the management approach to it (including a comprehensive discussion of the known risks, side effects and potential benefits of treatment), the patient (family) agrees to implement the following specific plan:  When outside we recommend using a wide brim hat, sunglasses, long sleeve and pants, sunscreen with SPF 30+ with reapplication every 2 hours, or SPF specific clothing     What is a lentigo?  A lentigo is a pigmented flat or slightly raised lesion with a clearly defined edge. Unlike an ephelis (freckle), it does not fade in the winter months. There are several kinds of lentigo.  The name lentigo originally referred to its appearance resembling a small lentil. The plural of lentigo is lentigines, although “lentigos” is also in common use.    Who gets lentigines?  Lentigines can affect males and females of all ages and races. Solar lentigines are especially prevalent in fair skinned adults. Lentigines associated with syndromes " are present at birth or arise during childhood.    What causes lentigines?  Common forms of lentigo are due to exposure to ultraviolet radiation:  Sun damage including sunburn   Indoor tanning   Phototherapy, especially photochemotherapy (PUVA)    Ionizing radiation, eg radiation therapy, can also cause lentigines.  Several familial syndromes associated with widespread lentigines originate from mutations in Yordan-MAP kinase, mTOR signaling and PTEN pathways.    What is the treatment for lentigines?  Most lentigines are left alone. Attempts to lighten them may not be successful. The following approaches are used:  SPF 50+ broad-spectrum sunscreen   Hydroquinone bleaching cream   Alpha hydroxy acids   Vitamin C   Retinoids   Azelaic acid   Chemical peels  Individual lesions can be permanently removed using:  Cryotherapy   Intense pulsed light   Pigment lasers    How can lentigines be prevented?  Lentigines associated with exposure ultraviolet radiation can be prevented by very careful sun protection. Clothing is more successful at preventing new lentigines than are sunscreens.    What is the outlook for lentigines?  Lentigines usually persist. They may increase in number with age and sun exposure. Some in sun-protected sites may fade and disappear.

## 2024-04-30 NOTE — PROGRESS NOTES
"[facility] Note     Patient Name: Nora Harp  Encounter Date: 4/30/24     Have you been cared for by a [facility] Dermatologist in the last 3 years and, if so, which description applies to you?    Yes.  I have been here within the last 3 years, and my medical history has NOT changed since that time.  I am FEMALE/of child-bearing potential.    REVIEW OF SYSTEMS:  Have you recently had or currently have any of the following? No changes in my recent health.   PAST MEDICAL HISTORY:  Have you personally ever had or currently have any of the following?  If \"YES,\" then please provide more detail. No changes in my medical history.   HISTORY OF IMMUNOSUPPRESSION: Do you have a history of any of the following:  Systemic Immunosuppression such as Diabetes, Biologic or Immunotherapy, Chemotherapy, Organ Transplantation, Bone Marrow Transplantation?  No     Answering \"YES\" requires the addition of the dotphrase \"IMMUNOSUPPRESSED\" as the first diagnosis of the patient's visit.   FAMILY HISTORY:  Any \"first degree relatives\" (parent, brother, sister, or child) with the following?    No changes in my family's known health.   PATIENT EXPERIENCE:    Do you want the Dermatologist to perform a COMPLETE skin exam today including a clinical examination under the \"bra and underwear\" areas?  Yes  If necessary, do we have your permission to call and leave a detailed message on your Preferred Phone number that includes your specific medical information?  Yes      No Known Allergies   Current Outpatient Medications:   •  Cholecalciferol (D3 2000 PO), Take by mouth, Disp: , Rfl:   •  Magnesium 200 MG TABS, Take by mouth, Disp: , Rfl:   •  spironolactone (ALDACTONE) 100 mg tablet, Take 1 tablet (100 mg total) by mouth daily, Disp: 90 tablet, Rfl: 4  •  spironolactone (ALDACTONE) 100 mg tablet, Take 1 tablet (100 mg total) by mouth daily, Disp: 180 tablet, Rfl: 3  •  tretinoin (RETIN-A) 0.025 % cream, Apply topically daily " "at bedtime Spread one pea-sized amount of medication over entire face about one hour before bedtime., Disp: 45 g, Rfl: 2          Whom besides the patient is providing clinical information about today's encounter?   NO ADDITIONAL HISTORIAN (patient alone provided history)    Physical Exam and Assessment/Plan by Diagnosis:    ACNE VULGARIS (\"COMMON ACNE\")    Physical Exam:  Psychiatric/Mood:  Anatomic Location Affected:  face  Morphological Description:  Open/Closed Comedones:  Rare (\"Almost Clear\")  Inflammatory Papules/Pustules:  No evidence (\"Clear\")  Nodules:  No evidence (\"Clear\")  Scarring:  No evidence (\"Clear\")  Excoriations:  No evidence (\"Clear\")  Local Skin Redness/Erythema:  Rare (\"Almost Clear\")  Local Skin Dryness/Scaling:  No evidence (\"Clear\")  Local Skin Dyspigmentation:  No evidence (\"Clear\")  Pertinent Positives:  Pertinent Negatives:    Additional History of Present Condition:  patient takes Spironolactone 100 mg, tretinoin 0.025% cream     Assessment and Plan:  We reviewed the causes of acne, the “kinds” of acne, and the expected clinical course.  We discussed treatment options ranging from over-the-counter products, topical retinoids, antibiotics, BP, hormonal therapies (OCPs/spironolactone), and isotretinoin (Accutane).  We reviewed specific over-the-counter interventions and medications. Recommended typical hygiene measures including water-based facial products, washing regularly with mild cleanser, and refraining from picking and popping any pimples.   Recommended non-comedogenic sunscreen use daily.   Expectations of therapy discussed. Side effects, risks and benefits of medications discussed.  A comprehensive handout on Acne was provided.  The phone number to call in case of questions or concerns (and instructions to stop medications in such a scenario) was provided.  After lengthy discussion of etiology and treatment options, we decided to implement the following personalized treatment " plan:    Based on a thorough discussion of this condition and the management approach to it (including a comprehensive discussion of the known risks, side effects and potential benefits of treatment), the patient (family) agrees to implement the following specific plan:    --------------------------------------------------------------------------------------  YOUR PERSONALIZED ACNE ACTION PLAN    “MORNING ROUTINE”         Continue with taking the Spironolactone 100 mg   ANTIBIOTICS:    None    “EVENING ROUTINE”    SKIN HYGIENE:  In the shower, wash your face, chest and back gently with Cetaphil moisturizing cleanser or Dove Fragrance-free bar.  Do not use a lufa or washcloth as these tend to be too irritating to acne-prone skin.    ANTIBIOTICS:    None    TOPICAL RETINOID:  At 1 hour before bedtime (after washing your face and allowing the skin to completely dry), spread only a single pea-sized amount of this medication evenly over your entire face (avoiding your eyes or mouth):  Continue with the Tretinoin 0.025% micro cream one hour before bedtime    REMEMBER:  Always take your acne pills with lots of water!  A pill stuck in your throat can cause significant burning and irritation.   Drink a full glass of water to ensure the pill gets into your stomach.  Avoid “popping” a pill right before bed, and stay upright for at least 1 hour after taking a pill.      ACNE:  WHAT ZIT ALL ABOUT?    WHY DO I HAVE ACNE/PIMPLES?  Your skin is made of layers.  To keep the skin from becoming dry and cracked, the skin needs oil. The oil is made in little wells in the deeper layers in the skin.  People with acne have glands that make more oil and are more easily plugged, causing the glands to swell. Hormones, bacteria and your inherited tendency to have acne all play a role.    The medical term for “pimples” is acne or acne vulgaris (vulgaris means “common”). Most people get some acne. Acne does not come from being dirty.  Instead,  it is an expected consequence of changes that occur during normal growth and development. Hormones, bacteria, and your family's tendency to have acne may all play a role.     “Whiteheads” or “blackheads” are openings of the glands (glands are the oil factories) onto the surface of the skin. “Blackheads” are not caused by dirt blocking the pores; instead, they result from the oxidation reaction of oil and skin in the pores with the air (like a “rust” reaction).        WHAT ABOUT STRESS?  Stress does not “cause” acne but it can make it worse. Make sure you get enough sleep and daily exercise!     WHAT ABOUT FOODS/DIET?  Try to eat a balanced, healthy diet. Some people feel that certain foods worsen their acne. While there aren't many studies available on this question, severe dietary changes are unlikely to help your acne and may be harmful to the health of your skin. If you find that a certain food seems to aggravate your acne, you may consider avoiding that food. Discuss this with your physician!    WHAT CAUSES MY ACNE?  There are four contributors to acne--the body's natural oil (sebum), clogged pores, bacteria (with the scientific name Propionibacterium acnes, or P. acnes, for short), and the body's reaction to the bacteria living in the clogged pores (which causes inflammation). Here's what happens:    Sebum is produced in the normal oil-making glands in the deeper layers of the skin and reaches the surface through the skin's pores. An increase in certain hormones occurs around the time of puberty, and these hormones trigger the oil glands to produce increased amounts of sebum.  Pores with excess oil tend to become clogged more easily.  At the same time, P. acnes--one of the many types of bacteria that normally live on everyone's skin--thrives in the excess oil and causes a skin reaction (inflammation).  If a pore is clogged close to the surface, there is little inflammation. However, this results in the  formation of “whiteheads” (closed comedones) or “blackheads” (open comedones) at the surface of the skin.  A plug that extends to, or forms a little deeper in the pore, or one that enlarges or ruptures may cause more inflammation. The result is red bumps (papules) and pus-filled pimples (pustules).  If plugging happens in the deepest skin layer, the inflammation may be even more severe, resulting in the formation of nodules or cysts. When these types of acne heal, they may leave behind discolored areas or true scars.    SKIN HYGIENE:  HOW SHOULD I WASH MY SKIN?  Acne does not come from being dirty, however, washing your face is part of taking good care of your skin and will help keep your face clear.  Good skin hygiene is, therefore, critical to support any acne treatment plan.  Here are several specific suggestions for practicing good skin hygiene and keeping your skin looking its best:    You should wash acne-prone skin TWICE A DAY: Once in the morning and once in the evening. This does include any showers you take that day, so do not overdo it!  Do not scrub the skin with a washcloth or loofah as these can irritate and inflame your acne. Acne does not come from “dirt”, so it is not necessary to scrub the skin clean. In fact, scrubbing may lead to dryness and irritation that makes the acne even worse and harder for patients to tolerate acne medications.   Use a gentle facial moisturizing cleanser (Cetaphil Moisturizing Cleanser or Dove Fragrance-Free bar).  Avoid using soaps like Ivory, Dial, [place], Lever, or soft/liquid soaps as these products will dry your skin.  Do not use any over-the-counter “acne washes” without your doctor's specific instruction to do so.  These products often contain salicylic acid or benzoyl peroxide. These ingredients can be helpful in clearing oil from the skin and reducing bacteria, but they may also be drying and can add to irritation.   Do not use exfoliating products with  microbeads or brushes as these can cause irritation to the skin.   Facials and other treatments to remove, squeeze, or “clean out” pores are not recommended. Manipulating the skin in this way can make acne worse and can lead to severe infections and/or scarring. It also increases the likelihood that the skin will not be able to tolerate acne medications.   Try not to “pop pimples” or pick at your acne as this can delay healing and may result in scarring or skin color changes (“dark spots”) that are often more noticeable than the acne itself. Picking/popping acne can also cause a serious skin infection.  Wash or change your pillow case once to twice a week, especially if you use products in your hair.   Wash the skin as soon as possible after playing sports or other activities that cause a lot of sweating. Also, pay attention to how your sports equipment (shoulder pads, helmet strap, etc.) might be making your acne worse.  When you use makeup, moisturizer, or sunscreen make sure that these products are labeled “non-comedogenic,” or “won't clog pores,” or “won't cause acne.”       SHOULD I TREAT MY ACNE?    There are a number of other skin conditions that can look like acne. If there is any question about the diagnosis, then the person should be evaluated by a board certified pediatric and adolescent dermatologist.  A physician should examine any child with acne who is between the ages of 1 and 7 years of age, as acne in this “mid-childhood” age group is not normal and may signal an underlying problem.   If a “preadolescent” (7 to 11 years of age) or “adolescent” (12 to 18 years of age) has mild acne and the condition is not bothersome to the individual, proper and regular skin care (what your doctor may call “skin hygiene”) may be all that is needed at this point  Many people do, however, need specific acne medications to help their skin look and feel its best. Your doctor will tell you if you are one of these  people. If so, you may be advised to use an over-the-counter or prescription medication that is applied to the skin (a “topical medication”) or if the addition of an oral medication (a medication “taken by mouth”) is needed. The good news is that the medications work well when used properly!  Some specific factors that may influence the choice of acne therapy include:    Severity. The number and type of skin lesions (papules or comedones) and the degree of inflammation (mild, moderate or severe).  Scarring. Scarring is most common when acne is severe, but it can happen even in children with mild acne.  Impact. If a child is experiencing emotional complications because of the acne or is experiencing negative comments from other children.  Cost of the acne medications.  An acne expert can help to keep “out of pocket” costs to a minimum by utilizing the correct medications and the least expensive options.  The patient's skin type (“oily” versus “dry” or “combination skin,” for example).  Potential side effects of the medication.  The ease or overall complexity of the treatment plan or medication.    WHAT ACNE TREATMENTS ARE AVAILABLE?    Medications for acne try to stop the formation of new pimples by reducing or removing the oil, bacteria, and other things (like dead skin cells) that clog the pores. They can also decrease the inflammation or irritation response of the skin to bacteria. It may take from 6 to 8 weeks (about 2 months!) before you see any improvement and know if the medication is effective. It takes the layers of skin this long to regenerate. Remember, these medications do not “cure” the condition--the acne improves because of the medication. Therefore, treatment must be continued in order to prevent the return of acne lesions.    There are many types of acne treatments. Some are applied to the skin (“topical” medications) and some are taken by mouth (“oral” medications). In most cases of mild acne, the  doctor will start with a topical medication.  There are many different topical medications that are helpful for acne.  If acne is more severe and it does not respond adequately to a topical medication, or if it covers large body surface areas such as the back and/or chest, oral antibiotics such as Doxycycline or Minocycline and/or oral hormone therapy such as Oral Contraceptive Pills or Spironolactone may be prescribed. In the most severe cases, isotretinoin (Accutane) may be used.     In general, it is usually best to start with acne medications that are least likely to cause side effects but are at the same time capable of addressing the specific causes for the acne. Some patients have a good result with just one medication, but many will need to use a combination of treatments: two or more different topical agents or an oral medication plus a topical medication.     Another treatment used for acne may include corticosteroid injections, which are used to help relieve pain, decrease the size, and encourage the healing of large, inflamed acne nodules. Also, dermatologists sometimes perform “acne surgery,” using a fine needle, a pointed blade, or an instrument known as a comedone extractor to mechanically clean out clogged pores. One must always weigh the risk for inducing a scar with the potential benefits of any procedure. Prior treatment with topical retinoids can “loosen” whiteheads and blackheads and make it easier to physically remove such lesions.     Heat-based devices, and light and laser therapy are being studied to see whether there is any role for such treatments in mild to moderate acne. At this time, there is not enough evidence to make general recommendations about their use.    TOPICAL ACNE MEDICATIONS    WHAT KIND OF TOPICALS ARE THERE?  Benzoyl peroxide (BP) helps to fight inflammation and is anti-microbial (kills bacteria, viruses, and other microorganisms) and is believed to help prevent  resistance of bacteria to topical antibiotics. A benzoyl peroxide “wash” may be recommended for use on large areas such as the chest and/or back. Mild irritation and dryness are common when first using benzoyl peroxide-containing products. Be careful because benzoyl peroxide can bleach towels and clothing!  Retinoids (such as adapalene, tretinoin, or tazarotene) unplug the oil glands by helping peel away the layers of skin and other things plugging the opening of the glands. Mild irritation and dryness are common when first using these products. Facial waxing and other skin procedures can lead to excessive irritation and should be avoided during retinoid therapy.  Antibiotics fight bacteria and help decrease inflammation. Topical antibiotics commonly used in acne include clindamycin, erythromycin, and combination agents (such as clindamycin/benzoyl peroxide or erythromycin/benzoyl peroxide). Mild irritation and dryness are common when first using these products. Typically, topical antibiotics should not be used alone as treatment for acne.  Other topical agents include salicylic acid, azelaic acid, dapsone, and sulfacetamide. Mild irritation and dryness can also occur when first using these products.    USING YOUR TOPICAL TREATMENTS LIKE A PRO  Apply topical medications only to clean, dry skin. Topical medications may lead to significant dryness of the affected areas. To minimize this, wait 15-20 minutes after washing before applying your topical medication.   These medications work deep in the skin to prevent new breakouts. “Spot treatment” of individual pimples does not do much. When applying topical medications to the face, use the “5-dot” method. Start by placing a small pea-sized amount of the medication on your finger. Then, place “dots” in each of five locations of your face: Mid-forehead, each cheek, nose, and chin. Next, rub the medication into the entire area of skin - not just on individual pimples! Try  to avoid the delicate skin around your eyes and corners of your mouth.  The medications are not magic!  They take weeks if not months to work. Be patient and use your medicine on a daily basis or as directed for six weeks before asking if your skin looks better. Try not to miss more than one or two days each week when using your medications.  If you are starting a new medication, then try using it “every other night” or even “every third night.” Gradually work up to “once a day.”  This will give your skin time to adjust.  The same medications often come in various forms or formulations: Creams, ointments, lotions, gels, microspheres, or foams. Use the formulation that has been recommended and don't switch to other forms unless instructed. Some forms (such as alcohol based gels) may be more drying and less tolerable for certain skin types.  Sometimes individual medications are not as effective as a combination of two or more agents. The doctor may need to try several medications or combinations before finding the one that is best for that patient.  Moisturizer, sunscreen, and make-up may be used in conjunction with topical acne medications. In general, acne medications are applied first so they may directly contact the skin. Ask your physician to review specific application instructions!  It is especially important to always use sunscreen when using a topical retinoid or oral antibiotic. These drugs can make your skin more sensitive to the sun. In general, sunscreen gets applied AFTER any acne medications.  Don't stop using your acne medications just because your acne got better. Remember, the acne is better because of the medication, and prevention is the fay to treatment.      ORAL ACNE MEDICATIONS    ORAL ANTIBIOTICS  Antibiotics include tetracycline-class medicines (which include the most commonly used oral antibiotics for acne, minocycline, and doxycycline), erythromycin, trimethoprim-sulfamethoxazole, and  occasionally cephalexin or azithromycin. These drugs may decrease bacteria and inflammation, and they are most effective for moderate-to-severe “inflammatory” acne. A product containing benzoyl peroxide should be used along with these antibiotics to help decrease the possibility of microbial resistance.    Always take your acne pills with lots of water!  A pill stuck in your throat can cause significant burning and irritation.   Drink a full glass of water to ensure the pill gets into your stomach.  Avoid “popping” a pill right before bed, and stay upright for at least 1 hour after taking a pill.    DOXYCYCLINE   This medication is usually taken ONCE or TWICE per day, as instructed by your physician.   NOTE: Always take this medication with lots of water! A pill stuck in the throat can cause significant burning and irritation. Avoid “popping” a pill right before bed & stay upright for at least one hour after taking a pill.   WARNING: Doxycycline increases your sensitivity to the sun, so practice excellent sun protection! If you notice any of the following, stop using the medication and notify your health care provider: headaches; blurred vision; dizziness; sun sensitivity; heartburn-stomach pain; irritation of the esophagus; darkening of scars, gums, or teeth (more often with minocycline); nail changes; yellowing of the eyes or skin (indicating possible liver disease); joint pains-and flu-like symptoms. Taking oral antibiotics with food may help with symptoms of upset stomach.   COMMON SIDE EFFECTS: Headaches; dizziness; sun sensitivity; irritation of the throat; discoloration of scars, gums, or teeth; nail changes.     MINOCYCLINE   This medication is usually taken ONCE or TWICE per day, as instructed by your physician.   NOTE: Always take this medication with lots of water! A pill stuck in the throat can cause significant burning and irritation. Avoid “popping” a pill right before bed & stay upright for at least  one hour after taking a pill.   WARNING: Though less likely than doxycycline, minocycline may increase your sensitivity to the sun, so practice excellent sun protection! If you notice any of the following, stop using the medication and notify your health care provider: headaches; blurred vision; dizziness; sun sensitivity; heartburn-stomach pain; irritation of the throat; darkening of scars, gums, or teeth; nail changes; yellowing of the eyes or skin (indicating possible liver disease); joint pains-and flu-like symptoms. Taking oral antibiotics with food may help with symptoms of upset stomach.   COMMON SIDE EFFECTS: Headaches; dizziness; sun sensitivity; irritation of the throat; discoloration of scars, gums, or teeth (often with minocycline); nail changes.   Minocycline can rarely cause liver disease, joint pains, severe skin rashes, and flu-like symptoms. If you should notice yellowing of the eyes or skin, or any of the above, notify your doctor and stop using the medication immediately.     HAVING PROBLEMS WITH ANY OF YOUR TREATMENTS?    You should not be able to see any of the medicines on your face. If you can see a white film on your skin after you apply the medication, there is too much medicine in that area and you need to apply a thinner coat and make sure it is spread evenly on your face.      If your skin gets too dry, you can apply a light (“non-comedogenic”) moisturizer on top of your medicine or you may switch to using the medicine every other day instead of every day.  If your skin is still too irritated, you may need to switch to a milder medication.      If your skin is red and very itchy, you may be allergic to the medication and you should stop using it.      COMMON POSSIBLE SIDE EFFECTS OF MEDICATIONS    Retinoids - dryness, redness, increased sun sensitivity.  Benzoyl peroxide - drying, redness, bleaching of clothes, towels and sheets, allergy.  Doxycycline - headaches; dizziness; irritation of  "the throat; nail changes; discoloration of teeth.  Sun sensitivity - even if you have dark skin, this medicine can make you burn more easily.  Make sure you protect yourself from the sun, either by avoiding being outside between 11 AM and 3 PM, wearing and reapplying sunscreen/sunblock, or wearing sun protective clothing  Nausea/vomiting - if you experience nausea with this medication, take it with food.    WHEN AND WHERE TO CALL WITH CONCERNS  We are here to help!  If you experience any unusual symptoms, then stop taking or using the medication and call our office at [phone] (SKIN).  It is better to be safe than to be sorry!     MELANOCYTIC NEVI (\"Moles\")    Physical Exam:  Anatomic Location Affected:   Mostly on sun-exposed areas of the trunk and extremities  Morphological Description:  Scattered, 1-4mm round to ovoid, symmetrical-appearing, even bordered, skin colored to dark brown macules/papules, mostly in sun-exposed areas  Pertinent Positives:  Pertinent Negatives:    Assessment and Plan:  Based on a thorough discussion of this condition and the management approach to it (including a comprehensive discussion of the known risks, side effects and potential benefits of treatment), the patient (family) agrees to implement the following specific plan:  When outside we recommend using a wide brim hat, sunglasses, long sleeve and pants, sunscreen with SPF 30+ with reapplication every 2 hours, or SPF specific clothing   Benign, reassured  Annual skin check     Melanocytic Nevi  Melanocytic nevi (\"moles\") are tan or brown, raised or flat areas of the skin which have an increased number of melanocytes. Melanocytes are the cells in our body which make pigment and account for skin color.    Some moles are present at birth (I.e., \"congenital nevi\"), while others come up later in life (i.e., \"acquired nevi\").  The sun can stimulate the body to make more moles.  Sunburns are not the only thing that triggers more " "moles.  Chronic sun exposure can do it too.     Clinically distinguishing a healthy mole from melanoma may be difficult, even for experienced dermatologists. The \"ABCDE's\" of moles have been suggested as a means of helping to alert a person to a suspicious mole and the possible increased risk of melanoma.  The suggestions for raising alert are as follows:    Asymmetry: Healthy moles tend to be symmetric, while melanomas are often asymmetric.  Asymmetry means if you draw a line through the mole, the two halves do not match in color, size, shape, or surface texture. Asymmetry can be a result of rapid enlargement of a mole, the development of a raised area on a previously flat lesion, scaling, ulceration, bleeding or scabbing within the mole.  Any mole that starts to demonstrate \"asymmetry\" should be examined promptly by a board certified dermatologist.     Border: Healthy moles tend to have discrete, even borders.  The border of a melanoma often blends into the normal skin and does not sharply delineate the mole from normal skin.  Any mole that starts to demonstrate \"uneven borders\" should be examined promptly by a board certified dermatologist.     Color: Healthy moles tend to be one color throughout.  Melanomas tend to be made up of different colors ranging from dark black, blue, white, or red.  Any mole that demonstrates a color change should be examined promptly by a board certified dermatologist.     Diameter: Healthy moles tend to be smaller than 0.6 cm in size; an exception are \"congenital nevi\" that can be larger.  Melanomas tend to grow and can often be greater than 0.6 cm (1/4 of an inch, or the size of a pencil eraser). This is only a guideline, and many normal moles may be larger than 0.6 cm without being unhealthy.  Any mole that starts to change in size (small to bigger or bigger to smaller) should be examined promptly by a board certified dermatologist.     Evolving: Healthy moles tend to \"stay the " "same.\"  Melanomas may often show signs of change or evolution such as a change in size, shape, color, or elevation.  Any mole that starts to itch, bleed, crust, burn, hurt, or ulcerate or demonstrate a change or evolution should be examined promptly by a board certified dermatologist.      LENTIGO    Physical Exam:  Anatomic Location Affected:  trunk, arms  Morphological Description:  Light brown macules  Pertinent Positives:  Pertinent Negatives:    Assessment and Plan:  Based on a thorough discussion of this condition and the management approach to it (including a comprehensive discussion of the known risks, side effects and potential benefits of treatment), the patient (family) agrees to implement the following specific plan:  When outside we recommend using a wide brim hat, sunglasses, long sleeve and pants, sunscreen with SPF 30+ with reapplication every 2 hours, or SPF specific clothing     What is a lentigo?  A lentigo is a pigmented flat or slightly raised lesion with a clearly defined edge. Unlike an ephelis (freckle), it does not fade in the winter months. There are several kinds of lentigo.  The name lentigo originally referred to its appearance resembling a small lentil. The plural of lentigo is lentigines, although “lentigos” is also in common use.    Who gets lentigines?  Lentigines can affect males and females of all ages and races. Solar lentigines are especially prevalent in fair skinned adults. Lentigines associated with syndromes are present at birth or arise during childhood.    What causes lentigines?  Common forms of lentigo are due to exposure to ultraviolet radiation:  Sun damage including sunburn   Indoor tanning   Phototherapy, especially photochemotherapy (PUVA)    Ionizing radiation, eg radiation therapy, can also cause lentigines.  Several familial syndromes associated with widespread lentigines originate from mutations in Yordan-MAP kinase, mTOR signaling and PTEN pathways.    What is the " treatment for lentigines?  Most lentigines are left alone. Attempts to lighten them may not be successful. The following approaches are used:  SPF 50+ broad-spectrum sunscreen   Hydroquinone bleaching cream   Alpha hydroxy acids   Vitamin C   Retinoids   Azelaic acid   Chemical peels  Individual lesions can be permanently removed using:  Cryotherapy   Intense pulsed light   Pigment lasers    How can lentigines be prevented?  Lentigines associated with exposure ultraviolet radiation can be prevented by very careful sun protection. Clothing is more successful at preventing new lentigines than are sunscreens.    What is the outlook for lentigines?  Lentigines usually persist. They may increase in number with age and sun exposure. Some in sun-protected sites may fade and disappear.      Scribe Attestation    I,:  Licha Ramirez am acting as a scribe while in the presence of the attending physician.:       I,:  Susan Barrios MD personally performed the services described in this documentation    as scribed in my presence.:

## 2024-05-14 ENCOUNTER — SOCIAL WORK (OUTPATIENT)
Dept: BEHAVIORAL/MENTAL HEALTH CLINIC | Facility: CLINIC | Age: 29
End: 2024-05-14
Payer: COMMERCIAL

## 2024-05-14 DIAGNOSIS — F43.22 ADJUSTMENT DISORDER WITH ANXIOUS MOOD: Primary | ICD-10-CM

## 2024-05-14 PROCEDURE — 90834 PSYTX W PT 45 MINUTES: CPT | Performed by: SOCIAL WORKER

## 2024-05-14 NOTE — PSYCH
Behavioral Health Psychotherapy Progress Note    Psychotherapy Provided: Individual Psychotherapy     1. Adjustment disorder with anxious mood            Goals addressed in session: Goal 1     DATA: Nora reports feeling ok stating that she received the bad news that her uncle has been diagnosed with Alzheimer's disease. She spoke about how this triggered a sense of acute grief knowing that her family is getting smaller. We acknowledged ways in which this is true for herself and ways in which she can build upon her community and purpose, mainly through a job. We explored underlying root causes to her procrastination and avoidance of job seeking and discussed her fear of disappointment/being a disappointment. Worked through inner child wounds of not being enough and worked to correct that by visualization of younger self paired with soothing and accepting statements.   During this session, this clinician used the following therapeutic modalities: Cognitive Behavioral Therapy and Cognitive Processing Therapy    Substance Abuse was not addressed during this session. If the client is diagnosed with a co-occurring substance use disorder, please indicate any changes in the frequency or amount of use: NA. Stage of change for addressing substance use diagnoses: No substance use/Not applicable    ASSESSMENT:  Nora Harp presents with a Euthymic/ normal mood.     her affect is Normal range and intensity, which is congruent, with her mood and the content of the session. The client has made progress on their goals.    During this session the client was oriented to person, place, and time. The client was engaged in the session. The client was able to sustain direct eye contact and was without psychomotor agitation. The client's thought processes were linear and clear.   Nora Harp presents with a none risk of suicide, none risk of self-harm, and none risk of harm to others.    For any risk assessment that surpasses a  "\"low\" rating, a safety plan must be developed.    A safety plan was indicated: no  If yes, describe in detail NA    PLAN: Between sessions, Nora Harp will take medication as prescribed and practice positive coping strategies as needed . At the next session, the therapist will use Cognitive Behavioral Therapy and Cognitive Processing Therapy to address stressors.    Behavioral Health Treatment Plan and Discharge Planning: Nora Harp is aware of and agrees to continue to work on their treatment plan. They have identified and are working toward their discharge goals. yes    Visit start and stop times:    05/14/24  Start Time: 0930  Stop Time: 1020  Total Visit Time: 50 minutes  "

## 2024-05-28 ENCOUNTER — SOCIAL WORK (OUTPATIENT)
Dept: BEHAVIORAL/MENTAL HEALTH CLINIC | Facility: CLINIC | Age: 29
End: 2024-05-28
Payer: COMMERCIAL

## 2024-05-28 DIAGNOSIS — F43.22 ADJUSTMENT DISORDER WITH ANXIOUS MOOD: Primary | ICD-10-CM

## 2024-05-28 PROCEDURE — 90834 PSYTX W PT 45 MINUTES: CPT | Performed by: SOCIAL WORKER

## 2024-05-28 NOTE — PSYCH
Behavioral Health Psychotherapy Progress Note    Psychotherapy Provided: Individual Psychotherapy     1. Adjustment disorder with anxious mood            Goals addressed in session: Goal 1     DATA: Nora reports feeling well overall. We discussed current issues with decision fatigue and how she is projecting her sense of overwhelm onto choices like whether or not to attend a friend's wedding rather than looking for a job. We talked about growth mindset and how this commonly thrives in being able to be accountable to the decisions that she makes in the moment, which she was able to understand. Worked on identifying current values based on age and development. She was given handouts which we discussed and compared from previous stages in development. She acknowledged that the biggest barrier in being able to be flexible and forgiving of herself is the accountable piece wherein she lacks sometimes in being honest with herself. Reviewed handouts and will bring them back in next session to discuss again in further detail.   During this session, this clinician used the following therapeutic modalities: Cognitive Behavioral Therapy and Cognitive Processing Therapy    Substance Abuse was not addressed during this session. If the client is diagnosed with a co-occurring substance use disorder, please indicate any changes in the frequency or amount of use: NA. Stage of change for addressing substance use diagnoses: No substance use/Not applicable    ASSESSMENT:  Nora Harp presents with a Euthymic/ normal mood.     her affect is Normal range and intensity, which is congruent, with her mood and the content of the session. The client has made progress on their goals.    During this session the client was oriented to person, place, and time. The client was engaged in the session. The client was able to sustain direct eye contact and was without psychomotor agitation. The client's thought processes were linear and clear.    "Nora Harp presents with a none risk of suicide, none risk of self-harm, and none risk of harm to others.    For any risk assessment that surpasses a \"low\" rating, a safety plan must be developed.    A safety plan was indicated: no  If yes, describe in detail NA    PLAN: Between sessions, Nora Harp will take medication as prescribed and practice positive coping strategies as needed . At the next session, the therapist will use Cognitive Behavioral Therapy and Cognitive Processing Therapy to address stressors.    Behavioral Health Treatment Plan and Discharge Planning: Nora Harp is aware of and agrees to continue to work on their treatment plan. They have identified and are working toward their discharge goals. yes    Visit start and stop times:    05/28/24  Start Time: 0930  Stop Time: 1020  Total Visit Time: 50 minutes  "

## 2024-06-11 ENCOUNTER — SOCIAL WORK (OUTPATIENT)
Dept: BEHAVIORAL/MENTAL HEALTH CLINIC | Facility: CLINIC | Age: 29
End: 2024-06-11
Payer: COMMERCIAL

## 2024-06-11 DIAGNOSIS — F43.22 ADJUSTMENT DISORDER WITH ANXIOUS MOOD: Primary | ICD-10-CM

## 2024-06-11 PROCEDURE — 90834 PSYTX W PT 45 MINUTES: CPT | Performed by: SOCIAL WORKER

## 2024-06-13 NOTE — PSYCH
"Behavioral Health Psychotherapy Progress Note    Psychotherapy Provided: Individual Psychotherapy     1. Adjustment disorder with anxious mood            Goals addressed in session: Goal 1     DATA: Nora reports feeling ok overall. She discussed feeling stressed and somewhat in a state of shock having been sat down by her mom who confessed that she is in growing financial peril having actively ignored her worsening situation for many years. Nora acknowledged and worked through various feelings such as disappointment, embarrassment, anger, and overwhelm herself. We acknowledged what about this is so hurtful to her (\"that I have been asking her to be emotionally available for me which she is not able to do; only for her to ask that of me\". We validated this and then worked on acknowledging what she can control vs. What she can't, and how she might be able to support her mom without becoming overly involved/managing. She will discuss with her mom financially supportive an  to help organize/better prioritize her finances.   During this session, this clinician used the following therapeutic modalities: Cognitive Behavioral Therapy and Cognitive Processing Therapy    Substance Abuse was not addressed during this session. If the client is diagnosed with a co-occurring substance use disorder, please indicate any changes in the frequency or amount of use: NA. Stage of change for addressing substance use diagnoses: No substance use/Not applicable    ASSESSMENT:  Nora Harp presents with a Euthymic/ normal mood.     her affect is Normal range and intensity, which is congruent, with her mood and the content of the session. The client has made progress on their goals.    During this session the client was oriented to person, place, and time. The client was engaged in the session. The client was able to sustain direct eye contact and was without psychomotor agitation. The client's thought processes were " "linear and clear.   Nora Harp presents with a none risk of suicide, none risk of self-harm, and none risk of harm to others.    For any risk assessment that surpasses a \"low\" rating, a safety plan must be developed.    A safety plan was indicated: no  If yes, describe in detail NA    PLAN: Between sessions, Nora Harp will take medication as prescribed and practice positive coping strategies as needed . At the next session, the therapist will use Cognitive Behavioral Therapy and Cognitive Processing Therapy to address stressors.    Behavioral Health Treatment Plan and Discharge Planning: Nora Harp is aware of and agrees to continue to work on their treatment plan. They have identified and are working toward their discharge goals. yes    Visit start and stop times:    06/13/24  Start Time: 0930  Stop Time: 1020  Total Visit Time: 50 minutes  "

## 2024-07-09 ENCOUNTER — SOCIAL WORK (OUTPATIENT)
Dept: BEHAVIORAL/MENTAL HEALTH CLINIC | Facility: CLINIC | Age: 29
End: 2024-07-09
Payer: COMMERCIAL

## 2024-07-09 DIAGNOSIS — F43.22 ADJUSTMENT DISORDER WITH ANXIOUS MOOD: Primary | ICD-10-CM

## 2024-07-09 PROCEDURE — 90837 PSYTX W PT 60 MINUTES: CPT | Performed by: SOCIAL WORKER

## 2024-07-09 NOTE — PSYCH
Behavioral Health Psychotherapy Progress Note    Psychotherapy Provided: Individual Psychotherapy     1. Adjustment disorder with anxious mood            Goals addressed in session: Goal 1     DATA: Nora reports feeling ok stating that unfortunately she had a death in her family (cousin Bryon who has struggled with alcoholism for many years) a couple of weeks ago which has triggered her own grief related to her dad. She spoke about how this death has brought up a lot of disgust and anger within her towards those who have the addiction and towards the disease itself. We talked about the importance of naming her feelings as they arise and leaning into family supports which she historically has not done. We continue to work on expanding black and white thinking patterns as it relates to herself within her relationships and allowing herself to be vulnerable. She was receptive to feedback. No new symptoms or safety concerns. Return in 2 weeks.   During this session, this clinician used the following therapeutic modalities: Cognitive Behavioral Therapy and Cognitive Processing Therapy    Substance Abuse was not addressed during this session. If the client is diagnosed with a co-occurring substance use disorder, please indicate any changes in the frequency or amount of use: NA. Stage of change for addressing substance use diagnoses: No substance use/Not applicable    ASSESSMENT:  Nora Harp presents with a Euthymic/ normal mood.     her affect is Normal range and intensity, which is congruent, with her mood and the content of the session. The client has made progress on their goals.    During this session the client was oriented to person, place, and time. The client was engaged in the session. The client was able to sustain direct eye contact and was without psychomotor agitation. The client's thought processes were linear and clear.   Nora Harp presents with a none risk of suicide, none risk of self-harm, and  "none risk of harm to others.    For any risk assessment that surpasses a \"low\" rating, a safety plan must be developed.    A safety plan was indicated: no  If yes, describe in detail NA    PLAN: Between sessions, Nora Harp will take medication as prescribed and practice positive coping strategies as needed . At the next session, the therapist will use Cognitive Behavioral Therapy and Cognitive Processing Therapy to address stressors.    Behavioral Health Treatment Plan and Discharge Planning: Nora Harp is aware of and agrees to continue to work on their treatment plan. They have identified and are working toward their discharge goals. yes    Visit start and stop times:    07/09/24  Start Time: 0900  Stop Time: 1000  Total Visit Time: 60 minutes  "

## 2024-07-23 ENCOUNTER — SOCIAL WORK (OUTPATIENT)
Dept: BEHAVIORAL/MENTAL HEALTH CLINIC | Facility: CLINIC | Age: 29
End: 2024-07-23
Payer: COMMERCIAL

## 2024-07-23 DIAGNOSIS — F43.22 ADJUSTMENT DISORDER WITH ANXIOUS MOOD: Primary | ICD-10-CM

## 2024-07-23 PROCEDURE — 90837 PSYTX W PT 60 MINUTES: CPT | Performed by: SOCIAL WORKER

## 2024-07-23 NOTE — PSYCH
Behavioral Health Psychotherapy Progress Note    Psychotherapy Provided: Individual Psychotherapy     1. Adjustment disorder with anxious mood            Goals addressed in session: Goal 1     DATA: Nora reports feeling well stating that she just returned from her week away at [place] wherein she meets with the other [employer]. She reported having a nice time and felt good about sticking through the hard times that the [place] experienced last year. She discussed a couple of friends and family members and how she related to their somewhat extreme views on relationships and feelings. We worked on examining what her value base is and how this aligns with navigating relationships, imperfection, and tolerance. She was receptive to feedback and engaged. She was able to come to  with who she is and how that may deviate from the rest of her friend group and that more importantly, not every feeling is appropriate. No safety concerns or new symptoms. Return in 2 weeks or sooner if needed.   During this session, this clinician used the following therapeutic modalities: Cognitive Behavioral Therapy and Cognitive Processing Therapy    Substance Abuse was not addressed during this session. If the client is diagnosed with a co-occurring substance use disorder, please indicate any changes in the frequency or amount of use: NA. Stage of change for addressing substance use diagnoses: No substance use/Not applicable    ASSESSMENT:  Nora Harp presents with a Euthymic/ normal mood.     her affect is Normal range and intensity, which is congruent, with her mood and the content of the session. The client has made progress on their goals.    During this session the client was oriented to person, place, and time. The client was engaged in the session. The client was able to sustain direct eye contact and was without psychomotor agitation. The client's thought processes were linear and clear.   Nora Harp presents with  "a none risk of suicide, none risk of self-harm, and none risk of harm to others.    For any risk assessment that surpasses a \"low\" rating, a safety plan must be developed.    A safety plan was indicated: no  If yes, describe in detail NA    PLAN: Between sessions, Nora Harp will take medication as prescribed and practice positive coping strategies as needed . At the next session, the therapist will use Cognitive Behavioral Therapy and Cognitive Processing Therapy to address stressors.    Behavioral Health Treatment Plan and Discharge Planning: Nora Harp is aware of and agrees to continue to work on their treatment plan. They have identified and are working toward their discharge goals. yes    Visit start and stop times:    07/23/24  Start Time: 0900  Stop Time: 1000  Total Visit Time: 60 minutes  "

## 2024-08-06 ENCOUNTER — SOCIAL WORK (OUTPATIENT)
Dept: BEHAVIORAL/MENTAL HEALTH CLINIC | Facility: CLINIC | Age: 29
End: 2024-08-06
Payer: COMMERCIAL

## 2024-08-06 DIAGNOSIS — F43.22 ADJUSTMENT DISORDER WITH ANXIOUS MOOD: Primary | ICD-10-CM

## 2024-08-06 PROCEDURE — 90834 PSYTX W PT 45 MINUTES: CPT | Performed by: SOCIAL WORKER

## 2024-08-06 NOTE — PSYCH
"Behavioral Health Psychotherapy Progress Note    Psychotherapy Provided: Individual Psychotherapy     1. Adjustment disorder with anxious mood            Goals addressed in session: Goal 1     DATA: Nora reports feeling well stating that she has been spending time with friends and their new baby which has been nice. Has noticed a shift in personal priorities and is now considering wanting children. Spent time discussing this shift in mindset but did also acknowledge possibility for procrastination in that she has still not yet found a job nor has been seriously looking. Encouraged Nora to begin to take serious interest in that it is clear that she is yearning for new commitments. Continue to expand on black and white thought processes. No new symptoms or safety concerns. 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   During this session, this clinician used the following therapeutic modalities: Cognitive Behavioral Therapy and Cognitive Processing Therapy    Substance Abuse was not addressed during this session. If the client is diagnosed with a co-occurring substance use disorder, please indicate any changes in the frequency or amount of use: NA. Stage of change for addressing substance use diagnoses: No substance use/Not applicable    ASSESSMENT:  Nora Harp presents with a Euthymic/ normal mood.     her affect is Normal range and intensity, which is congruent, with her mood and the content of the session. The client has made progress on their goals.    During this session the client was oriented to person, place, and time. The client was engaged in the session. The client was able to sustain direct eye contact and was without psychomotor agitation. The client's thought processes were linear and clear.   Nora Harp presents with a none risk of suicide, none risk of self-harm, and none risk of harm to others.    For any risk assessment that surpasses a \"low\" rating, " a safety plan must be developed.    A safety plan was indicated: no  If yes, describe in detail NA    PLAN: Between sessions, Nora Harp will take medication as prescribed and practice positive coping strategies as needed . At the next session, the therapist will use Cognitive Behavioral Therapy and Cognitive Processing Therapy to address stressors.    Behavioral Health Treatment Plan and Discharge Planning: Nora Harp is aware of and agrees to continue to work on their treatment plan. They have identified and are working toward their discharge goals. yes    Visit start and stop times:    08/06/24  Start Time: 0900  Stop Time: 0950  Total Visit Time: 50 minutes

## 2024-08-12 ENCOUNTER — TELEPHONE (OUTPATIENT)
Dept: PSYCHIATRY | Facility: CLINIC | Age: 29
End: 2024-08-12

## 2024-09-03 ENCOUNTER — SOCIAL WORK (OUTPATIENT)
Dept: BEHAVIORAL/MENTAL HEALTH CLINIC | Facility: CLINIC | Age: 29
End: 2024-09-03
Payer: COMMERCIAL

## 2024-09-03 DIAGNOSIS — F43.22 ADJUSTMENT DISORDER WITH ANXIOUS MOOD: Primary | ICD-10-CM

## 2024-09-03 PROCEDURE — 90834 PSYTX W PT 45 MINUTES: CPT | Performed by: SOCIAL WORKER

## 2024-09-03 NOTE — PSYCH
Behavioral Health Psychotherapy Progress Note    Psychotherapy Provided: Individual Psychotherapy     1. Adjustment disorder with anxious mood            Goals addressed in session: Goal 1     DATA: Nora reports feeling ok overall. She recently attended a wedding for friends and has a trip planned for herself at the end of this week. She reported feeling excited for this but also reminded of the fact that she does not have a partner to enjoy this with but rather a group of friends, whom she loves, but has different expectations for. Acknowledged recurring pain of lack of partnership and explored what it could look like investing more of herself into dating. Addressed areas of nervousness or bias and weighed risk/reward. Receptive to challening of automatic negative thoughts and honored emotional need as it has come up a few times for herself. Will work on being more open to emotional needs. No new symptoms or safety concerns. Return in 2 weeks or sooner if needed.   During this session, this clinician used the following therapeutic modalities: Cognitive Behavioral Therapy, Cognitive Processing Therapy, and Supportive Psychotherapy    Substance Abuse was not addressed during this session. If the client is diagnosed with a co-occurring substance use disorder, please indicate any changes in the frequency or amount of use: NA. Stage of change for addressing substance use diagnoses: No substance use/Not applicable    ASSESSMENT:  Nora Harp presents with a Euthymic/ normal mood.     her affect is Normal range and intensity, which is congruent, with her mood and the content of the session. The client has made progress on their goals.    During this session the client was oriented to person, place, and time. The client was engaged in the session. The client was able to sustain direct eye contact and was without psychomotor agitation. The client's thought processes were linear and clear.   Nora Harp presents  "with a none risk of suicide, none risk of self-harm, and none risk of harm to others.    For any risk assessment that surpasses a \"low\" rating, a safety plan must be developed.    A safety plan was indicated: no  If yes, describe in detail NA    PLAN: Between sessions, Nora Harp will take medication as prescribed and practice positive coping strategies as needed . At the next session, the therapist will use Cognitive Behavioral Therapy, Cognitive Processing Therapy, and Supportive Psychotherapy to address stressors.    Behavioral Health Treatment Plan and Discharge Planning: Nora Harp is aware of and agrees to continue to work on their treatment plan. They have identified and are working toward their discharge goals. yes    Visit start and stop times:    09/03/24  Start Time: 0900  Stop Time: 0950  Total Visit Time: 50 minutes  "

## 2024-09-18 ENCOUNTER — TELEPHONE (OUTPATIENT)
Dept: PSYCHIATRY | Facility: CLINIC | Age: 29
End: 2024-09-18

## 2024-10-01 ENCOUNTER — SOCIAL WORK (OUTPATIENT)
Dept: BEHAVIORAL/MENTAL HEALTH CLINIC | Facility: CLINIC | Age: 29
End: 2024-10-01
Payer: COMMERCIAL

## 2024-10-01 DIAGNOSIS — F43.22 ADJUSTMENT DISORDER WITH ANXIOUS MOOD: Primary | ICD-10-CM

## 2024-10-01 PROCEDURE — 90834 PSYTX W PT 45 MINUTES: CPT | Performed by: SOCIAL WORKER

## 2024-10-02 NOTE — PSYCH
"Behavioral Health Psychotherapy Progress Note    Psychotherapy Provided: Individual Psychotherapy     1. Adjustment disorder with anxious mood            Goals addressed in session: Goal 1     DATA: Nora reports feeling ok overall. She is struggling with some low mood and irritability which she is recognizing and managing without trying to suppress or people please. We talked about ways in which she has been getting stronger in recognizing, sitting with, and then reflecting on her emotions. Writer encouraged breath work to help work through these issues which she agreed to doing. She is looking for a job now and talked about ways in which this experience has been humbling for her. Will refer for a psych eval as per her report to discuss possible medication through the winter months. No new symptoms or safety concerns. Return in 2 weeks.   During this session, this clinician used the following therapeutic modalities: Client-centered Therapy    Substance Abuse was not addressed during this session. If the client is diagnosed with a co-occurring substance use disorder, please indicate any changes in the frequency or amount of use: NA. Stage of change for addressing substance use diagnoses: No substance use/Not applicable    ASSESSMENT:  Nora Harp presents with a Euthymic/ normal mood.     her affect is Normal range and intensity, which is congruent, with her mood and the content of the session. The client has made progress on their goals.    During this session the client was oriented to person, place, and time. The client was engaged in the session. The client was able to sustain direct eye contact and was without psychomotor agitation. The client's thought processes were linear and clear.   Nora Harp presents with a none risk of suicide, none risk of self-harm, and none risk of harm to others.    For any risk assessment that surpasses a \"low\" rating, a safety plan must be developed.    A safety plan was " indicated: no  If yes, describe in detail NA    PLAN: Between sessions, Nora Harp will take medication as prescribed and practice positive coping strategies as needed . At the next session, the therapist will use Client-centered Therapy to address stressors.    Behavioral Health Treatment Plan and Discharge Planning: Nora Harp is aware of and agrees to continue to work on their treatment plan. They have identified and are working toward their discharge goals. yes    Visit start and stop times:    10/02/24  Start Time: 0900  Stop Time: 0950  Total Visit Time: 50 minutes

## 2024-10-15 ENCOUNTER — SOCIAL WORK (OUTPATIENT)
Dept: BEHAVIORAL/MENTAL HEALTH CLINIC | Facility: CLINIC | Age: 29
End: 2024-10-15
Payer: COMMERCIAL

## 2024-10-15 DIAGNOSIS — F43.22 ADJUSTMENT DISORDER WITH ANXIOUS MOOD: Primary | ICD-10-CM

## 2024-10-15 PROCEDURE — 90834 PSYTX W PT 45 MINUTES: CPT | Performed by: SOCIAL WORKER

## 2024-10-16 NOTE — PSYCH
Behavioral Health Psychotherapy Progress Note    Psychotherapy Provided: Individual Psychotherapy     1. Adjustment disorder with anxious mood            Goals addressed in session: Goal 1     DATA: Nora reports feeling ok overall. She has been coping with moments of irritability which she is trying to connect with but having a difficult time understanding why she is feeling that way. Walked through current stressors such as trying to find a job but being unsuccessful. Worked on verbalizing what about this is uncomfortable and recognized elements of being humbled as she believes that she under estimated the job finding process. Ultimately admitted to not feeling motivated or impassioned about a career despite her knowing that she needs one in order to be self sufficient. Worked on understanding current values as they are (honesty, creativity, and community) and allowing her to feel negative emotions as they are without judgment. Reviewed helpful coping strategies such as journaling, talking with friends, exercise, and walking outside in the sun.   During this session, this clinician used the following therapeutic modalities: Cognitive Behavioral Therapy and Cognitive Processing Therapy    Substance Abuse was not addressed during this session. If the client is diagnosed with a co-occurring substance use disorder, please indicate any changes in the frequency or amount of use: NA. Stage of change for addressing substance use diagnoses: No substance use/Not applicable    ASSESSMENT:  Nora Harp presents with a Euthymic/ normal mood.     her affect is Normal range and intensity, which is congruent, with her mood and the content of the session. The client has made progress on their goals.    During this session the client was oriented to person, place, and time. The client was engaged in the session. The client was able to sustain direct eye contact and was without psychomotor agitation. The client's thought  "processes were linear and clear.   Nora Harp presents with a none risk of suicide, none risk of self-harm, and none risk of harm to others.    For any risk assessment that surpasses a \"low\" rating, a safety plan must be developed.    A safety plan was indicated: no  If yes, describe in detail NA    PLAN: Between sessions, Nora Harp will take medication as prescribed and practice positive coping strategies as needed . At the next session, the therapist will use Cognitive Behavioral Therapy and Cognitive Processing Therapy to address stressors.    Behavioral Health Treatment Plan and Discharge Planning: Nora Harp is aware of and agrees to continue to work on their treatment plan. They have identified and are working toward their discharge goals. yes    Visit start and stop times:    10/16/24  Start Time: 0900  Stop Time: 0950  Total Visit Time: 50 minutes  "

## 2024-11-12 ENCOUNTER — SOCIAL WORK (OUTPATIENT)
Dept: BEHAVIORAL/MENTAL HEALTH CLINIC | Facility: CLINIC | Age: 29
End: 2024-11-12
Payer: COMMERCIAL

## 2024-11-12 DIAGNOSIS — F43.22 ADJUSTMENT DISORDER WITH ANXIOUS MOOD: Primary | ICD-10-CM

## 2024-11-12 PROCEDURE — 90834 PSYTX W PT 45 MINUTES: CPT | Performed by: SOCIAL WORKER

## 2024-11-14 NOTE — PSYCH
"Behavioral Health Psychotherapy Progress Note    Psychotherapy Provided: Individual Psychotherapy     1. Adjustment disorder with anxious mood            Goals addressed in session: Goal 1     DATA: Nora reports feeling stressed and upset stating that she is very disappointed in the election results and even went so far as to get into an argument with her mom. She shared that for once she allowed for her inner adolescent anger to be present although she does not like how it feels. She has soothed herself by going to the beach and journaling which was refreshing. She will continue to work on being true to how she feels in the moment and soothing herself through other means other than people pleasing. No new symptoms or safety concerns. Return in 2 weeks.   During this session, this clinician used the following therapeutic modalities: Cognitive Behavioral Therapy and Cognitive Processing Therapy    Substance Abuse was not addressed during this session. If the client is diagnosed with a co-occurring substance use disorder, please indicate any changes in the frequency or amount of use: NA. Stage of change for addressing substance use diagnoses: No substance use/Not applicable    ASSESSMENT:  Nora Harp presents with a Euthymic/ normal mood.     her affect is Normal range and intensity, which is congruent, with her mood and the content of the session. The client has made progress on their goals.    During this session the client was oriented to person, place, and time. The client was engaged in the session. The client was able to sustain direct eye contact and was without psychomotor agitation. The client's thought processes were linear and clear.   Nora Harp presents with a none risk of suicide, none risk of self-harm, and none risk of harm to others.    For any risk assessment that surpasses a \"low\" rating, a safety plan must be developed.    A safety plan was indicated: no  If yes, describe in detail " NA    PLAN: Between sessions, Nora Harp will take medication as prescribed and practice positive coping strategies as needed . At the next session, the therapist will use Cognitive Behavioral Therapy and Cognitive Processing Therapy to address stressors.    Behavioral Health Treatment Plan and Discharge Planning: Nora Harp is aware of and agrees to continue to work on their treatment plan. They have identified and are working toward their discharge goals. yes    Visit start and stop times:    11/14/24  Start Time: 0900  Stop Time: 0950  Total Visit Time: 50 minutes

## 2024-11-26 ENCOUNTER — SOCIAL WORK (OUTPATIENT)
Dept: BEHAVIORAL/MENTAL HEALTH CLINIC | Facility: CLINIC | Age: 29
End: 2024-11-26
Payer: COMMERCIAL

## 2024-11-26 DIAGNOSIS — F43.22 ADJUSTMENT DISORDER WITH ANXIOUS MOOD: Primary | ICD-10-CM

## 2024-11-26 PROCEDURE — 90834 PSYTX W PT 45 MINUTES: CPT | Performed by: SOCIAL WORKER

## 2024-11-29 NOTE — PSYCH
"Behavioral Health Psychotherapy Progress Note    Psychotherapy Provided: Individual Psychotherapy     1. Adjustment disorder with anxious mood            Goals addressed in session: Goal 1     DATA: Nora reports feeling somewhat stressed stating that she is anticipating her family's Thanksgiving dinner to be disappointing because she does not feel bonded to her extended family, and feels as though she puts more effort in extending herself to her family than they do for her. Discussed what she can and cannot control, and reviewed ways to simply acknowledge and hold space for emotions, rather trying to  them or fix them. Was receptive to feedback. No safety concerns. Return in 2 weeks.   During this session, this clinician used the following therapeutic modalities: Cognitive Processing Therapy    Substance Abuse was not addressed during this session. If the client is diagnosed with a co-occurring substance use disorder, please indicate any changes in the frequency or amount of use: NA. Stage of change for addressing substance use diagnoses: No substance use/Not applicable    ASSESSMENT:  Nora Harp presents with a Euthymic/ normal mood.     her affect is Normal range and intensity, which is congruent, with her mood and the content of the session. The client has made progress on their goals.    During this session the client was oriented to person, place, and time. The client was engaged in the session. The client was able to sustain direct eye contact and was without psychomotor agitation. The client's thought processes were linear and clear.   Nora Harp presents with a none risk of suicide, none risk of self-harm, and none risk of harm to others.    For any risk assessment that surpasses a \"low\" rating, a safety plan must be developed.    A safety plan was indicated: no  If yes, describe in detail NA    PLAN: Between sessions, Nora Harp will take medication as prescribed and practice positive " coping strategies as needed . At the next session, the therapist will use Cognitive Processing Therapy to address stressors.    Behavioral Health Treatment Plan and Discharge Planning: Nora Harp is aware of and agrees to continue to work on their treatment plan. They have identified and are working toward their discharge goals. yes    Visit start and stop times:    11/29/24  Start Time: 0900  Stop Time: 0950  Total Visit Time: 50 minutes

## 2024-12-10 ENCOUNTER — SOCIAL WORK (OUTPATIENT)
Dept: BEHAVIORAL/MENTAL HEALTH CLINIC | Facility: CLINIC | Age: 29
End: 2024-12-10
Payer: COMMERCIAL

## 2024-12-10 DIAGNOSIS — F43.22 ADJUSTMENT DISORDER WITH ANXIOUS MOOD: Primary | ICD-10-CM

## 2024-12-10 PROCEDURE — 90834 PSYTX W PT 45 MINUTES: CPT | Performed by: SOCIAL WORKER

## 2024-12-12 NOTE — PSYCH
"Behavioral Health Psychotherapy Progress Note    Psychotherapy Provided: Individual Psychotherapy     1. Adjustment disorder with anxious mood            Goals addressed in session: Goal 1     DATA: Nora reports feeling ok overall having survived the holidays with her family in [place]. Talked through scenarios which were uncomfortable for her because it made her feel on the \"outs\". Related this to how her entire childhood was set up and how she has chronically felt disconnected from her family due to significant age gap. Talked through ways to pick and choose who she wishes to connect to and when. Gave mom credit for trying to be emotionally supportive. No new symptoms or safety concerns. Return in 1 month as her next visit will be on [place] and she has family plans.   During this session, this clinician used the following therapeutic modalities: Cognitive Processing Therapy    Substance Abuse was not addressed during this session. If the client is diagnosed with a co-occurring substance use disorder, please indicate any changes in the frequency or amount of use: NA. Stage of change for addressing substance use diagnoses: No substance use/Not applicable    ASSESSMENT:  Nora Harp presents with a Euthymic/ normal mood.     her affect is Normal range and intensity, which is congruent, with her mood and the content of the session. The client has made progress on their goals.    During this session the client was oriented to person, place, and time. The client was engaged in the session. The client was able to sustain direct eye contact and was without psychomotor agitation. The client's thought processes were linear and clear.   Nora Harp presents with a none risk of suicide, none risk of self-harm, and none risk of harm to others.    For any risk assessment that surpasses a \"low\" rating, a safety plan must be developed.    A safety plan was indicated: no  If yes, describe in detail NA    PLAN: " Between sessions, Nora Harp will take medication as prescribed and practice positive coping strategies as needed . At the next session, the therapist will use Cognitive Processing Therapy to address stressors.    Behavioral Health Treatment Plan and Discharge Planning: Nora Harp is aware of and agrees to continue to work on their treatment plan. They have identified and are working toward their discharge goals. yes    Depression Follow-up Plan Completed: Not applicable    Visit start and stop times:    12/12/24  Start Time: 0900  Stop Time: 0950  Total Visit Time: 50 minutes

## 2025-01-07 ENCOUNTER — SOCIAL WORK (OUTPATIENT)
Dept: BEHAVIORAL/MENTAL HEALTH CLINIC | Facility: CLINIC | Age: 30
End: 2025-01-07
Payer: COMMERCIAL

## 2025-01-07 DIAGNOSIS — F43.22 ADJUSTMENT DISORDER WITH ANXIOUS MOOD: Primary | ICD-10-CM

## 2025-01-07 PROCEDURE — 90834 PSYTX W PT 45 MINUTES: CPT | Performed by: SOCIAL WORKER

## 2025-01-07 NOTE — PSYCH
"Behavioral Health Psychotherapy Progress Note    Psychotherapy Provided: Individual Psychotherapy     1. Adjustment disorder with anxious mood            Goals addressed in session: Goal 1     DATA: Nora reports feeling well stating that she is relieved that the holiday season is over and does acknowledge being in a state of rest. Has consistently applied reasonable limits for self and others and is much more comfortable in her relationships as a result. Is looking for a full time job now and highly motivated to re-enter her adult life. Discussed treatment plan review and will continue this conversation at her next session, to be more clear in what she wants to work on for these next 6 months. No safety concerns. Return in 2 weeks.   During this session, this clinician used the following therapeutic modalities: Cognitive Processing Therapy    Substance Abuse was not addressed during this session. If the client is diagnosed with a co-occurring substance use disorder, please indicate any changes in the frequency or amount of use: NA. Stage of change for addressing substance use diagnoses: No substance use/Not applicable    ASSESSMENT:  Nora Harp presents with a Euthymic/ normal mood.     her affect is Normal range and intensity, which is congruent, with her mood and the content of the session. The client has made progress on their goals.    During this session the client was oriented to person, place, and time. The client was engaged in the session. The client was able to sustain direct eye contact and was without psychomotor agitation. The client's thought processes were linear and clear.   Nora Harp presents with a none risk of suicide, none risk of self-harm, and none risk of harm to others.    For any risk assessment that surpasses a \"low\" rating, a safety plan must be developed.    A safety plan was indicated: no  If yes, describe in detail NA    PLAN: Between sessions, Nora Harp will take " medication as prescribed and practice positive coping strategies as needed . At the next session, the therapist will use Cognitive Processing Therapy to address stressors.    Behavioral Health Treatment Plan and Discharge Planning: Nora Harp is aware of and agrees to continue to work on their treatment plan. They have identified and are working toward their discharge goals. yes    Depression Follow-up Plan Completed: Not applicable    Visit start and stop times:    01/07/25  Start Time: 0900  Stop Time: 0950  Total Visit Time: 50 minutes

## 2025-01-16 DIAGNOSIS — L70.0 ACNE VULGARIS: ICD-10-CM

## 2025-01-16 RX ORDER — SPIRONOLACTONE 100 MG/1
100 TABLET, FILM COATED ORAL DAILY
Qty: 180 TABLET | Refills: 0 | Status: SHIPPED | OUTPATIENT
Start: 2025-01-16 | End: 2027-01-06

## 2025-01-16 NOTE — TELEPHONE ENCOUNTER
Reason for call:   [x] Refill   [] Prior Auth  [x] Other: NOT a DUPLICATE. Change in pharmacies      Office:   [x] PCP/Provider -  Susan Barrios MD   [] Specialty/Provider -     Medication:     spironolactone (ALDACTONE) 100 mg tablet       Dose/Frequency:     Take 1 tablet (100 mg total) by mouth daily       Quantity: 180      Pharmacy: [facility] [employer] [address] [phone]     Does the patient have enough for 3 days?   [] Yes   [x] No - Send as HP to POD

## 2025-01-30 ENCOUNTER — TELEPHONE (OUTPATIENT)
Age: 30
End: 2025-01-30

## 2025-01-30 NOTE — TELEPHONE ENCOUNTER
Patient called in to review insurance plans that she is looking into.    Writer advised the 2 plans with the patient that are OON.    Patient was advised to contact the insurance company to review plans that are in network for .

## 2025-02-03 NOTE — TELEPHONE ENCOUNTER
Patient calling to advise she will have [facility] but have not gotten the cards in the mail as of yet. Patient will call them to make sure we are in network and to get the id# and she will call back with that information

## 2025-02-04 ENCOUNTER — SOCIAL WORK (OUTPATIENT)
Dept: BEHAVIORAL/MENTAL HEALTH CLINIC | Facility: CLINIC | Age: 30
End: 2025-02-04
Payer: COMMERCIAL

## 2025-02-04 DIAGNOSIS — F43.22 ADJUSTMENT DISORDER WITH ANXIOUS MOOD: Primary | ICD-10-CM

## 2025-02-04 PROCEDURE — 90834 PSYTX W PT 45 MINUTES: CPT | Performed by: SOCIAL WORKER

## 2025-02-04 NOTE — PSYCH
Behavioral Health Psychotherapy Progress Note    Psychotherapy Provided: Individual Psychotherapy     1. Adjustment disorder with anxious mood            Goals addressed in session: Goal 1     DATA: Nora reports feeling ok stating that she lost her health insurance and has been trying to get on a new plan which has been frustrating. Acknowledged how this impacts her sense of urgency to find a new job and feelings that go along with it. Worked on being honest with emotions and how she has been tolerating them. Reviewed ways to be present with herself non judgementally and to metabolize energy through journaling, talking it out, crying, and movement. Worked on understanding role of shame and how it is compounded with secrecy and isolation. Agreed to be more transparent about how she is doing and what she needs from others from safe, low risk people. Agreed to monthly visits until insurance is worked out. No safety concerns. Will reach out to provider should there be a change in circumstances.   During this session, this clinician used the following therapeutic modalities: Cognitive Processing Therapy and Mindfulness-based Strategies    Substance Abuse was not addressed during this session. If the client is diagnosed with a co-occurring substance use disorder, please indicate any changes in the frequency or amount of use: NA. Stage of change for addressing substance use diagnoses: No substance use/Not applicable    ASSESSMENT:  Nora Harp presents with a Euthymic/ normal mood.     her affect is Normal range and intensity, which is congruent, with her mood and the content of the session. The client has made progress on their goals.    During this session the client was oriented to person, place, and time. The client was engaged in the session. The client was able to sustain direct eye contact and was without psychomotor agitation. The client's thought processes were linear and clear.   Nora Harp presents  "with a none risk of suicide, none risk of self-harm, and none risk of harm to others.    For any risk assessment that surpasses a \"low\" rating, a safety plan must be developed.    A safety plan was indicated: no  If yes, describe in detail NA    PLAN: Between sessions, Nora Harp will practice coping strategies and emotional release techniques. At the next session, the therapist will use Client-centered Therapy to address stressors.    Behavioral Health Treatment Plan and Discharge Planning: Nora Harp is aware of and agrees to continue to work on their treatment plan. They have identified and are working toward their discharge goals. yes    Depression Follow-up Plan Completed: Not applicable    Visit start and stop times:    02/04/25  Start Time: 0900  Stop Time: 0950  Total Visit Time: 50 minutes  "

## 2025-02-04 NOTE — TELEPHONE ENCOUNTER
Patient has [place] MEDICAID that is active.  Patient is aware and knows that we do not accept medicaid.  Patient can't be self pay.  Please advise.

## 2025-04-09 ENCOUNTER — OFFICE VISIT (OUTPATIENT)
Dept: FAMILY MEDICINE CLINIC | Facility: CLINIC | Age: 30
End: 2025-04-09
Payer: COMMERCIAL

## 2025-04-09 VITALS
DIASTOLIC BLOOD PRESSURE: 80 MMHG | SYSTOLIC BLOOD PRESSURE: 110 MMHG | BODY MASS INDEX: 30.48 KG/M2 | TEMPERATURE: 99.5 F | HEIGHT: 63 IN | HEART RATE: 72 BPM | WEIGHT: 172 LBS

## 2025-04-09 DIAGNOSIS — Z13.6 SCREENING FOR CARDIOVASCULAR CONDITION: ICD-10-CM

## 2025-04-09 DIAGNOSIS — Z00.00 ANNUAL PHYSICAL EXAM: Primary | ICD-10-CM

## 2025-04-09 PROBLEM — R39.12 WEAK URINARY STREAM: Status: RESOLVED | Noted: 2022-05-12 | Resolved: 2025-04-09

## 2025-04-09 PROBLEM — N89.8 VAGINAL DISCHARGE: Status: RESOLVED | Noted: 2022-05-12 | Resolved: 2025-04-09

## 2025-04-09 PROCEDURE — 99395 PREV VISIT EST AGE 18-39: CPT | Performed by: INTERNAL MEDICINE

## 2025-04-09 NOTE — PROGRESS NOTES
Adult Annual Physical  Name: Nora Harp      : 1995      MRN: [id number]  Encounter Provider: Marcela Graham MD  Encounter Date: 2025   Encounter department: [facility]    :  Assessment & Plan  Annual physical exam         Screening for cardiovascular condition    Orders:  •  CBC; Future  •  Comprehensive metabolic panel; Future  •  Lipid panel; Future        Preventive Screenings:  - Diabetes Screening: risks/benefits discussed and orders placed  - Cholesterol Screening: orders placed and risks/benefits discussed   - Hepatitis C screening: screening up-to-date   - HIV screening: risks/benefits discussed   - Cervical cancer screening: screening up-to-date   - Colon cancer screening: risks/benefits discussed and screening not indicated   - Lung cancer screening: screening not indicated     Immunizations:    - Risks/benefits immunizations discussed      Counseling/Anticipatory Guidance:  - Alcohol: discussed moderation in alcohol intake and recommendations for healthy alcohol use.   - Dental health: discussed importance of regular tooth brushing, flossing, and dental visits.   - Sexual health: discussed sexually transmitted diseases, partner selection, use of condoms, avoidance of unintended pregnancy, and contraceptive alternatives.   - Diet: discussed recommendations for a healthy/well-balanced diet.   - Exercise: the importance of regular exercise/physical activity was discussed. Recommend exercise 3-5 times per week for at least 30 minutes.   - Injury prevention: discussed safety/seat belts, safety helmets, smoke detectors, carbon monoxide detectors, and smoking near bedding or upholstery.          History of Present Illness     Adult Annual Physical:  Patient presents for annual physical. Here for CPE.  Denies complaints or issues. .     Diet and Physical Activity:  - Diet/Nutrition: well balanced diet.  - Exercise: 5-7 times a week on average.    Depression Screening:  - PHQ-2  "Score: 0    General Health:  - Sleep: sleeps well and 7-8 hours of sleep on average.  - Hearing: normal hearing bilateral ears.  - Vision: wears glasses and contacts, vision problems and most recent eye exam < 1 year ago.  - Dental: regular dental visits, brushes teeth twice daily and floss regularly.    /GYN Health:  - Follows with GYN: yes.   - Menopause: premenopausal.     Advanced Care Planning:  - Has an advanced directive?: no    - Has a durable medical POA?: no    - ACP document given to patient?: no      Review of Systems   Constitutional:  Negative for chills and fever.   HENT:  Negative for ear pain and sore throat.    Eyes:  Negative for pain and visual disturbance.   Respiratory:  Negative for cough and shortness of breath.    Cardiovascular:  Negative for chest pain and palpitations.   Gastrointestinal:  Negative for abdominal pain and vomiting.   Genitourinary:  Negative for dysuria and hematuria.   Musculoskeletal:  Negative for arthralgias and back pain.   Skin:  Negative for color change and rash.   Neurological:  Negative for seizures and syncope.   All other systems reviewed and are negative.        Objective   /80   Pulse 72   Temp 99.5 °F (37.5 °C)   Ht 5' 2.99\" (1.6 m)   Wt 78 kg (172 lb)   LMP 04/09/2025 (Exact Date)   BMI 30.48 kg/m²     Physical Exam  Constitutional:       General: She is not in acute distress.     Appearance: She is well-developed. She is not diaphoretic.   HENT:      Head: Normocephalic and atraumatic.      Right Ear: External ear normal.      Left Ear: External ear normal.      Nose: Nose normal.   Eyes:      Pupils: Pupils are equal, round, and reactive to light.   Neck:      Thyroid: No thyromegaly.      Vascular: No JVD.   Cardiovascular:      Rate and Rhythm: Regular rhythm.      Heart sounds: No murmur heard.     No friction rub. No gallop.   Pulmonary:      Effort: Pulmonary effort is normal.      Breath sounds: Normal breath sounds. No wheezing or " rales.   Abdominal:      General: Bowel sounds are normal. There is no distension.      Palpations: Abdomen is soft.      Tenderness: There is no abdominal tenderness.   Musculoskeletal:         General: Normal range of motion.      Cervical back: Normal range of motion and neck supple.   Skin:     General: Skin is warm and dry.      Findings: No rash.   Neurological:      Mental Status: She is alert and oriented to person, place, and time.      Cranial Nerves: No cranial nerve deficit.      Sensory: No sensory deficit.      Motor: No abnormal muscle tone.      Coordination: Coordination normal.      Deep Tendon Reflexes: Reflexes normal.   Psychiatric:         Behavior: Behavior normal.         Thought Content: Thought content normal.         Judgment: Judgment normal.

## 2025-04-09 NOTE — PATIENT INSTRUCTIONS
"Patient Education     Routine physical for adults   The Basics   Written by the doctors and editors at [facility]   What is a physical? -- A physical is a routine visit, or \"check-up,\" with your doctor. You might also hear it called a \"wellness visit\" or \"preventive visit.\"  During each visit, the doctor will:   Ask about your physical and mental health   Ask about your habits, behaviors, and lifestyle   Do an exam   Give you vaccines if needed   Talk to you about any medicines you take   Give advice about your health   Answer your questions  Getting regular check-ups is an important part of taking care of your health. It can help your doctor find and treat any problems you have. But it's also important for preventing health problems.  A routine physical is different from a \"sick visit.\" A sick visit is when you see a doctor because of a health concern or problem. Since physicals are scheduled ahead of time, you can think about what you want to ask the doctor.  How often should I get a physical? -- It depends on your age and health. In general, for people age 21 years and older:   If you are younger than 50 years, you might be able to get a physical every 3 years.   If you are 50 years or older, your doctor might recommend a physical every year.  If you have an ongoing health condition, like diabetes or high blood pressure, your doctor will probably want to see you more often.  What happens during a physical? -- In general, each visit will include:   Physical exam - The doctor or nurse will check your height, weight, heart rate, and blood pressure. They will also look at your eyes and ears. They will ask about how you are feeling and whether you have any symptoms that bother you.   Medicines - It's a good idea to bring a list of all the medicines you take to each doctor visit. Your doctor will talk to you about your medicines and answer any questions. Tell them if you are having any side effects that bother you. You " "should also tell them if you are having trouble paying for any of your medicines.   Habits and behaviors - This includes:   Your diet   Your exercise habits   Whether you smoke, drink alcohol, or use drugs   Whether you are sexually active   Whether you feel safe at home  Your doctor will talk to you about things you can do to improve your health and lower your risk of health problems. They will also offer help and support. For example, if you want to quit smoking, they can give you advice and might prescribe medicines. If you want to improve your diet or get more physical activity, they can help you with this, too.   Lab tests, if needed - The tests you get will depend on your age and situation. For example, your doctor might want to check your:   Cholesterol   Blood sugar   Iron level   Vaccines - The recommended vaccines will depend on your age, health, and what vaccines you already had. Vaccines are very important because they can prevent certain serious or deadly infections.   Discussion of screening - \"Screening\" means checking for diseases or other health problems before they cause symptoms. Your doctor can recommend screening based on your age, risk, and preferences. This might include tests to check for:   Cancer, such as breast, prostate, cervical, ovarian, colorectal, prostate, lung, or skin cancer   Sexually transmitted infections, such as chlamydia and gonorrhea   Mental health conditions like depression and anxiety  Your doctor will talk to you about the different types of screening tests. They can help you decide which screenings to have. They can also explain what the results might mean.   Answering questions - The physical is a good time to ask the doctor or nurse questions about your health. If needed, they can refer you to other doctors or specialists, too.  Adults older than 65 years often need other care, too. As you get older, your doctor will talk to you about:   How to prevent falling at " home   Hearing or vision tests   Memory testing   How to take your medicines safely   Making sure that you have the help and support you need at home  All topics are updated as new evidence becomes available and our peer review process is complete.  This topic retrieved from [employer] on: May 02, 2024.  Topic 928997 Version 1.0  Release: 32.4.3 - C32.122  © 2024 Up[employer], [employer] and/or its affiliates. All rights reserved.  Consumer Information Use and Disclaimer   Disclaimer: This generalized information is a limited summary of diagnosis, treatment, and/or medication information. It is not meant to be comprehensive and should be used as a tool to help the user understand and/or assess potential diagnostic and treatment options. It does NOT include all information about conditions, treatments, medications, side effects, or risks that may apply to a specific patient. It is not intended to be medical advice or a substitute for the medical advice, diagnosis, or treatment of a health care provider based on the health care provider's examination and assessment of a patient's specific and unique circumstances. Patients must speak with a health care provider for complete information about their health, medical questions, and treatment options, including any risks or benefits regarding use of medications. This information does not endorse any treatments or medications as safe, effective, or approved for treating a specific patient. Up[employer], Inc[employer] and its affiliates disclaim any warranty or liability relating to this information or the use thereof.The use of this information is governed by the Terms of Use, available at https://www.[employer].com/en/know/clinical-effectiveness-terms. 2024© Up[employer]Date, Inc. and its affiliates and/or licensors. All rights reserved.  Copyright   © 2024 [employer], [employer] and/or its affiliates. All rights reserved.

## 2025-04-23 LAB
ALBUMIN SERPL-MCNC: 4.7 G/DL (ref 3.9–4.9)
ALP SERPL-CCNC: 52 IU/L (ref 44–121)
ALT SERPL-CCNC: 12 IU/L (ref 0–32)
AST SERPL-CCNC: 12 IU/L (ref 0–40)
BASOPHILS # BLD AUTO: 0 X10E3/UL (ref 0–0.2)
BASOPHILS NFR BLD AUTO: 0 %
BILIRUB SERPL-MCNC: 0.6 MG/DL (ref 0–1.2)
BUN SERPL-MCNC: 8 MG/DL (ref 6–20)
BUN/CREAT SERPL: 10 (ref 9–23)
CALCIUM SERPL-MCNC: 9.5 MG/DL (ref 8.7–10.2)
CHLORIDE SERPL-SCNC: 102 MMOL/L (ref 96–106)
CHOLEST SERPL-MCNC: 190 MG/DL (ref 100–199)
CO2 SERPL-SCNC: 22 MMOL/L (ref 20–29)
CREAT SERPL-MCNC: 0.84 MG/DL (ref 0.57–1)
EGFR: 95 ML/MIN/1.73
EOSINOPHIL # BLD AUTO: 0.1 X10E3/UL (ref 0–0.4)
EOSINOPHIL NFR BLD AUTO: 1 %
ERYTHROCYTE [DISTWIDTH] IN BLOOD BY AUTOMATED COUNT: 11.6 % (ref 11.7–15.4)
GLOBULIN SER-MCNC: 2.2 G/DL (ref 1.5–4.5)
GLUCOSE SERPL-MCNC: 89 MG/DL (ref 70–99)
HCT VFR BLD AUTO: 41.8 % (ref 34–46.6)
HDLC SERPL-MCNC: 58 MG/DL
HGB BLD-MCNC: 14.2 G/DL (ref 11.1–15.9)
IMM GRANULOCYTES # BLD: 0 X10E3/UL (ref 0–0.1)
IMM GRANULOCYTES NFR BLD: 0 %
LDLC SERPL CALC-MCNC: 110 MG/DL (ref 0–99)
LYMPHOCYTES # BLD AUTO: 1.8 X10E3/UL (ref 0.7–3.1)
LYMPHOCYTES NFR BLD AUTO: 22 %
MCH RBC QN AUTO: 31.2 PG (ref 26.6–33)
MCHC RBC AUTO-ENTMCNC: 34 G/DL (ref 31.5–35.7)
MCV RBC AUTO: 92 FL (ref 79–97)
MICRODELETION SYND BLD/T FISH: NORMAL
MONOCYTES # BLD AUTO: 0.4 X10E3/UL (ref 0.1–0.9)
MONOCYTES NFR BLD AUTO: 6 %
NEUTROPHILS # BLD AUTO: 5.7 X10E3/UL (ref 1.4–7)
NEUTROPHILS NFR BLD AUTO: 71 %
PLATELET # BLD AUTO: 272 X10E3/UL (ref 150–450)
POTASSIUM SERPL-SCNC: 4.2 MMOL/L (ref 3.5–5.2)
PROT SERPL-MCNC: 6.9 G/DL (ref 6–8.5)
RBC # BLD AUTO: 4.55 X10E6/UL (ref 3.77–5.28)
SL AMB VLDL CHOLESTEROL CALC: 22 MG/DL (ref 5–40)
SODIUM SERPL-SCNC: 139 MMOL/L (ref 134–144)
TRIGL SERPL-MCNC: 125 MG/DL (ref 0–149)
WBC # BLD AUTO: 8 X10E3/UL (ref 3.4–10.8)

## 2025-04-24 ENCOUNTER — RESULTS FOLLOW-UP (OUTPATIENT)
Dept: FAMILY MEDICINE CLINIC | Facility: CLINIC | Age: 30
End: 2025-04-24

## 2025-07-29 ENCOUNTER — OFFICE VISIT (OUTPATIENT)
Age: 30
End: 2025-07-29

## 2025-07-29 VITALS — TEMPERATURE: 97.7 F | WEIGHT: 166 LBS | BODY MASS INDEX: 29.41 KG/M2 | HEIGHT: 63 IN

## 2025-07-29 DIAGNOSIS — L70.0 ACNE VULGARIS: ICD-10-CM

## 2025-07-29 DIAGNOSIS — L81.4 SOLAR LENTIGO: Primary | ICD-10-CM

## 2025-07-29 DIAGNOSIS — D22.5 MULTIPLE BENIGN MELANOCYTIC NEVI OF UPPER EXTREMITY, LOWER EXTREMITY, AND TRUNK: ICD-10-CM

## 2025-07-29 DIAGNOSIS — D22.70 MULTIPLE BENIGN MELANOCYTIC NEVI OF UPPER EXTREMITY, LOWER EXTREMITY, AND TRUNK: ICD-10-CM

## 2025-07-29 DIAGNOSIS — D22.60 MULTIPLE BENIGN MELANOCYTIC NEVI OF UPPER EXTREMITY, LOWER EXTREMITY, AND TRUNK: ICD-10-CM

## 2025-07-29 DIAGNOSIS — D18.01 CHERRY ANGIOMA: ICD-10-CM

## 2025-07-29 RX ORDER — SPIRONOLACTONE 100 MG/1
100 TABLET, FILM COATED ORAL DAILY
Qty: 180 TABLET | Refills: 3 | Status: SHIPPED | OUTPATIENT
Start: 2025-07-29 | End: 2027-07-19

## 2025-07-29 RX ORDER — TRETINOIN 0.25 MG/G
CREAM TOPICAL
Qty: 45 G | Refills: 3 | Status: SHIPPED | OUTPATIENT
Start: 2025-07-29